# Patient Record
Sex: MALE | Race: BLACK OR AFRICAN AMERICAN | Employment: UNEMPLOYED | URBAN - METROPOLITAN AREA
[De-identification: names, ages, dates, MRNs, and addresses within clinical notes are randomized per-mention and may not be internally consistent; named-entity substitution may affect disease eponyms.]

---

## 2017-01-24 ENCOUNTER — HOSPITAL ENCOUNTER (EMERGENCY)
Age: 27
Discharge: HOME | End: 2017-01-24
Attending: FAMILY MEDICINE
Payer: MEDICAID

## 2017-01-24 ENCOUNTER — APPOINTMENT (OUTPATIENT)
Dept: GENERAL RADIOLOGY | Age: 27
End: 2017-01-24
Attending: FAMILY MEDICINE
Payer: MEDICAID

## 2017-01-24 VITALS
SYSTOLIC BLOOD PRESSURE: 101 MMHG | TEMPERATURE: 98 F | HEART RATE: 106 BPM | RESPIRATION RATE: 30 BRPM | BODY MASS INDEX: 22.14 KG/M2 | OXYGEN SATURATION: 98 % | DIASTOLIC BLOOD PRESSURE: 55 MMHG | WEIGHT: 125 LBS

## 2017-01-24 DIAGNOSIS — J20.9 ACUTE BRONCHITIS, UNSPECIFIED ORGANISM: Primary | ICD-10-CM

## 2017-01-24 LAB
FLUAV AG NPH QL IA: NEGATIVE
FLUBV AG NOSE QL IA: NEGATIVE

## 2017-01-24 PROCEDURE — 87804 INFLUENZA ASSAY W/OPTIC: CPT | Performed by: FAMILY MEDICINE

## 2017-01-24 PROCEDURE — 74011000250 HC RX REV CODE- 250: Performed by: FAMILY MEDICINE

## 2017-01-24 PROCEDURE — 74011250637 HC RX REV CODE- 250/637: Performed by: FAMILY MEDICINE

## 2017-01-24 PROCEDURE — 71010 XR CHEST PORT: CPT

## 2017-01-24 PROCEDURE — 77030018846 HC SOL IRR STRL H20 ICUM -A

## 2017-01-24 PROCEDURE — 99284 EMERGENCY DEPT VISIT MOD MDM: CPT

## 2017-01-24 RX ORDER — DIAZEPAM 5 MG/1
7.5 TABLET ORAL
Status: COMPLETED | OUTPATIENT
Start: 2017-01-24 | End: 2017-01-24

## 2017-01-24 RX ORDER — ALBUTEROL SULFATE 0.83 MG/ML
5 SOLUTION RESPIRATORY (INHALATION) ONCE
Status: COMPLETED | OUTPATIENT
Start: 2017-01-24 | End: 2017-01-24

## 2017-01-24 RX ADMIN — DIAZEPAM 7.5 MG: 5 TABLET ORAL at 12:30

## 2017-01-24 RX ADMIN — ALBUTEROL SULFATE 5 MG: 2.5 SOLUTION RESPIRATORY (INHALATION) at 12:45

## 2017-01-24 NOTE — ED TRIAGE NOTES
Per his caregiver pt has had a low pulse ox reading,  Fever and \"think yellow phlem out of his trach  Sepsis Screening completed    (  )Patient meets SIRS criteria. (x  )Patient does not meet SIRS criteria.       SIRS Criteria is achieved when two or more of the following are present   Temperature < 96.8°F (36°C) or > 100.9°F (38.3°C)   Heart Rate > 90 beats per minute   Respiratory Rate > 20 beats per minute   WBC count > 12,000 or <4,000 or > 10% bands

## 2017-01-24 NOTE — ED PROVIDER NOTES
[address]  EMERGENCY DEPARTMENT HISTORY AND PHYSICAL EXAM       Date: 1/24/2017   Patient Name: Ema Dmuont   YOB: 1990  Medical Record Number: [id number]    History of Presenting Illness     Chief Complaint   Patient presents with    Chest Congestion    Fever        History Provided By:  caregiver    Additional History:   12:25 PM   Ema Dumont is a 32 y.o. 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 Caregiver further reports low grade fever (resolved, temperature in ED 98F) and states pt had thick/yellow mucous in his trach. She states that the pt had an appointment this morning with his PCP Brijesh Gill, DO to get his flu shot but was instead advised to come to the ED for evaluation due to these sxs. Caregiver denies any vomiting. Hx limited by pt's mental condition and nonverbal status.       Primary Care Provider: Isabel Diallo MD   Specialist:    Past History     Past Medical History:   Past Medical History   Diagnosis Date    Cerebral palsy ([address]     Cholesteatoma     Cystitis     Developmental delay      Severe MR secondary to prematurity    DIC (disseminated intravascular coagulation) ([address]     Dysphagia     Eczema     Fracture     GERD (gastroesophageal reflux disease)     Hearing loss      Sensorineural    Heart abnormality      PDA    Hip dislocation, bilateral ([address]     HX OTHER MEDICAL     Hyaline membrane disease     Incontinence      urianry and fecal    Influenza     MR (mental retardation)      severe/profound secondary to prematurity    MRSA (methicillin resistant staph aureus) culture positive     MRSA infection      Bactrium Resistant    Neurogenic bladder     PDA (patent ductus arteriosus)     Pneumonia     Premature birth     Psychiatric disorder Mental Retardation    Respiratory abnormalities      Intubation    Seizures (HCC)     Sleep apnea     Spasticity     Subglottic stenosis      mild    Tracheitis     Vision decreased     Visual field defect         Past Surgical History:   Past Surgical History   Procedure Laterality Date    Hx tracheostomy      Hx tonsil and adenoidectomy      Pr abdomen surgery proc unlisted       18g Dale Tube for Feeding    Hx orthopaedic       Hamstring Release, psoas and adductor release, rhizotomy, fracture repairs    Hx heent       Tympanomastoidectomy        Family History:   History reviewed. No pertinent family history. Social History:   Social History   Substance Use Topics    Smoking status: Never Smoker    Smokeless tobacco: None    Alcohol use No        Allergies: Allergies   Allergen Reactions    Amoxicillin Rash    Augmentin [Amoxicillin-Pot Clavulanate] Rash    Banana Rash    Clindamycin Rash    Genoptic [Gentamicin] Rash    Oatmeal Anti-Itch [Pramoxine-Calamine-Camphor] Rash    Peach (Prunus Persica) Other (comments)     Unknown      Peanut Butter Flavor Rash    Rice Rash    Rocephin [Ceftriaxone] Rash    Tobrex [Tobramycin Sulfate] Rash    Zarontin [Ethosuximide] Unknown (comments)    Zithromax [Azithromycin] Rash        Review of Systems   Review of Systems   Unable to perform ROS: Psychiatric disorder       Physical Exam  Vitals:    01/24/17 1400 01/24/17 1430 01/24/17 1445 01/24/17 1454   BP: 101/55 100/48 101/55    Pulse: 99 (!) 108 (!) 105 (!) 111   Resp: (!) 35 28 (!) 31 27   Temp:       SpO2: 97%   98%   Weight:           Physical Exam   Nursing note and vitals reviewed. Vital signs and nursing notes reviewed    CONSTITUTIONAL: Severely mentally retarded due to CP. Slightly agitated. HEAD:  Normocephalic, atraumatic  EYES: PERRL; EOM's intact. Nystagmus. ENTM: Nose: no rhinorrhea;  Throat: no erythema or exudate, mucous membranes moist  Neck:  No JVD, supple without lymphadenopathy  RESP: There are rhonchi present and pt is tachypneic. Pt has a trach. Equal breath sounds. CV: Tachycardic. S1 and S2 WNL; No murmurs, gallops or rubs. GI: Normal bowel sounds. Pt with a G-tube and wearing an adult diaper. No masses or organomegaly. : No costo-vertebral angle tenderness. BACK:  Non-tender  EXTREMITIES: Muscle weakness in BLEs with contractures in arms and legs. No edema, no calf tenderness. Distal pulses intact. NEURO: CN intact, reflexes 2/4 and sym, sensation intact. SKIN: No rashes; Normal for age and stage. PSYCH: Severely mentally retarded due to CP. Slightly agitated. Diagnostic Study Results     Labs -      Recent Results (from the past 12 hour(s))   INFLUENZA A & B AG (RAPID TEST)    Collection Time: 01/24/17 12:45 PM   Result Value Ref Range    Influenza A Antigen NEGATIVE  NEG      Influenza B Antigen NEGATIVE  NEG         Radiologic Studies -    XR CHEST PORT   Final Result: No acute cardiopulmonary disease. As read by the radiologist.            Medical Decision Making   I am the first provider for this patient. I reviewed the vital signs, available nursing notes, past medical history, past surgical history, family history and social history. Vital Signs-Reviewed the patient's vital signs. Patient Vitals for the past 12 hrs:   Temp Pulse Resp BP SpO2   01/24/17 1454 - (!) 111 27 - 98 %   01/24/17 1445 - (!) 105 (!) 31 101/55 -   01/24/17 1430 - (!) 108 28 100/48 -   01/24/17 1400 - 99 (!) 35 101/55 97 %   01/24/17 1330 - - 30 (!) 119/94 -   01/24/17 1246 - - - - (P) 100 %   01/24/17 1230 - (!) 117 (!) 50 105/79 100 %   01/24/17 1215 - (!) 108 (!) 40 117/86 99 %   01/24/17 1212 - (!) 121 27 134/74 99 %   01/24/17 1202 98 °F (36.7 °C) (!) 120 20 (!) 150/100 98 %       Pulse Oximetry Analysis - Normal 100% on room air.      Cardiac Monitor:   Rate: 109 bpm  Rhythm: Normal Sinus Rhythm      Provider Notes:     INITIAL CLINICAL IMPRESSION and PLANS:  The patient presents with the primary complaint(s) of: low pulse ox. The presentation, to include historical aspects and clinical findings are consistent with the DX of influenza. However, other possible DX's to consider as primary, associated with, or exacerbated by include:    PNA, bronchitis. Considering the above, my initial management plan to evaluate and therapeutic interventions include the following and as noted in the orders:    1. Imaging: CXR    ED Course:     Medications Given in the ED:  Medications   albuterol (PROVENTIL VENTOLIN) nebulizer solution 5 mg (5 mg Nebulization Given 1/24/17 1245)   diazePAM (VALIUM) tablet 7.5 mg (7.5 mg Per G Tube Given 1/24/17 1230)        PROGRESS NOTE:  12:25 PM   Initial assessment performed. 3:00 PM Rosalio Alcaraz MD spoke face to face with Malika Simon DO (Internal Medicine). He states pt can be discharged home and he has written prescriptions for Doxycycline and Mucinex for the pt. Discharge Note:  3:09 PM  Pt has been reexamined. Patient has no new complaints, changes, or physical findings. Care plan outlined and precautions discussed. Results were reviewed with the patient. All medications were reviewed with the patient; will d/c home. All of pt's questions and concerns were addressed. Patient was instructed and agrees to follow up with PCP, as well as to return to the ED upon further deterioration. Patient is ready to go home. Diagnosis   Clinical Impression:   1. Acute bronchitis, unspecified organism         Discussion: Pt has a history of CP. He has a trach. He has had a cough and low grade fever. Sats were stable, CXR was negative, flu was negative. Malika Simon DO saw pt in the ED and put him on Mucinex and Doxycycline.      Follow-up Information     Follow up With Details Comments [id number], MD Schedule an appointment as soon as possible for a visit Primary Care follow up [address] [place] [address] [address] [place] [address] Dr Sarah Olivares [facility] EMERGENCY DEPT  As needed, If symptoms worsen [address] [place]  [phone]          Current Discharge Medication List      CONTINUE these medications which have NOT CHANGED    Details   diazepam (VALIUM) 2 mg tablet 1.5 Tabs by Per G Tube route four (4) times daily. Max Daily Amount: 12 mg.  Qty: 12 Tab, Refills: 0      PHENobarbital (LUMINAL) 32.4 mg tablet 1 Tab by Per G Tube route two (2) times a day. Max Daily Amount: 64.8 mg.  Qty: 10 Tab, Refills: 0      valproic acid, as sodium salt, (DEPAKENE) 250 mg/5 mL (5 mL) soln oral solution 15 mL by Per G Tube route two (2) times a day. Qty: 150 mL, Refills: 0      levofloxacin (LEVAQUIN) 500 mg tablet 1 Tab by Per G Tube route daily. Qty: 7 Tab, Refills: 0      acetaminophen (TYLENOL) 325 mg tablet Take 650 mg by mouth every four (4) hours as needed for Pain or Fever. QUEtiapine (SEROQUEL) 25 mg tablet Take 25 mg by mouth nightly as needed. albuterol-ipratropium (DUO-NEB) 2.5 mg-0.5 mg/3 ml nebulizer solution 3 mL by Nebulization route every four (4) hours as needed. Qty: 30 Vial, Refills: 0      acetylcysteine (MUCOMYST) 100 mg/mL (10 %) nebulizer solution Take 4 mL by inhalation four (4) times daily. Qty: 30 mL, Refills: 0      EPINEPHrine (EPIPEN) 0.3 mg/0.3 mL injection 0.3 mg by IntraMUSCular route once as needed. levETIRAcetam (KEPPRA) 100 mg/mL solution 1,500 mg by PEG Tube route two (2) times a day. budesonide (PULMICORT) 0.5 mg/2 mL nebulizer suspension 500 mcg by Nebulization route two (2) times a day. baclofen (LIORESAL) 10 mg tablet Take 20 mg by mouth three (3) times daily. nystatin (MYCOSTATIN) 100,000 unit/g ointment Apply  to affected area two (2) times a day. multivit,tx w/iron, hematinic, (B PLEX PLUS) 27-0.8 mg Tab Take 1 Tab by mouth daily.         cetirizine (ZYRTEC) 10 mg tablet Take by mouth daily. magnesium hydroxide (CONTRERAS MILK OF MAGNESIA) 400 mg/5 mL suspension Take 15 mL by mouth daily. miconazole (MICOTIN) 2 % topical cream Apply  to affected area two (2) times daily as needed. bacitracin (BACITRACIN) 500 unit/g Oint Apply  to affected area three (3) times daily. bisacodyl (DULCOLAX) 10 mg suppository Insert 10 mg into rectum daily. _______________________________     Attestations: This note is prepared by Emilie Armando, acting as Scribe for Aida Garcia MD.    Aida Garcia MD: The scribe's documentation has been prepared under my direction and personally reviewed by me in its entirety.  I confirm that the note above accurately reflects all work, treatment, procedures, and medical decision making performed by me.   _______________________________

## 2017-01-24 NOTE — CONSULTS
Medicine Consult    Patient:  Conley Nageotte 32 y.o. male 1990  Asked to evaluate patient by Dr Ena Schaumann  Primary Care Provider:  Laura Du MD  Date of Admission:  1/24/2017  Reason for Consult: cough      Assessment/Plan   1. Viral syndrome, likely URI versus possible bronchitis. He is nontoxic appearing, with tachypneia. His tachycardia has improved  2. Cerebral palsy  3. Seizure disorder  4. Chronic respiratory failure post trach      PLAN:    - OK to DC back to group home  - continue scheduled duonebs, add mucinex and can start doxycycline for T> 101  - to follow up with me in next 2-3 days      Patient Active Problem List   Diagnosis Code    Esophageal reflux K21.9    CP (cerebral palsy) ([address] G80.9    Dysphagia R13.10    Seizures ([address] R56.9    Flexion contractures M24.50    Hypercarbia R06.89    Acute bronchitis J20.9    Acute hypoxemic respiratory failure ([address] J96.01    Chronic respiratory failure ([address] J96.10       Thank you for allowing us to participate in  Mr Jackson care  HPI:   CC: tachypnea    History obtained by caregiver present in room    Conley Nageotte is a 32 y.o. male with past medical history significant for cerebral palsy, seizure disorder, chronic trach due to subglottic stenosis, severe MR presents tot he ER due to worsening cough and tachypnea. His caregiver reports that he has been coughing since Friday and was mostly dry. Today he started to have clear secretions, drop his o2 sats in the 80s associated w tachypnea and tachycardia. He has had low grade fever ( 99). He has been tolerating his tube feeds, has had no seizures or behavioral changes. On presentation tot he ER eh was slightly tachypneic but improved w neb treament and suction. Exam w rhonchi but no rales or wheezing noted. He was afebrile. He had a CXR which was clear. On my evaluation he did have thin secretions from trach, in no distress and was exhibiting his normal behaviors.      Past Medical History   Diagnosis Date    [facility]     Cholesteatoma     Cystitis     Developmental delay      Severe MR secondary to prematurity    DIC (disseminated intravascular coagulation) (HCC)     Dysphagia     Eczema     Fracture     GERD (gastroesophageal reflux disease)     Hearing loss      Sensorineural    Heart abnormality      PDA    Hip dislocation, bilateral (HCC)     HX OTHER MEDICAL     Hyaline membrane disease     Incontinence      urianry and fecal    Influenza     MR (mental retardation)      severe/profound secondary to prematurity    MRSA (methicillin resistant staph aureus) culture positive     MRSA infection      Bactrium Resistant    Neurogenic bladder     PDA (patent ductus arteriosus)     Pneumonia     Premature birth     Psychiatric disorder      Mental Retardation    Respiratory abnormalities      Intubation    Seizures (HCC)     Sleep apnea     Spasticity     Subglottic stenosis      mild    Tracheitis     Vision decreased     Visual field defect      Past Surgical History   Procedure Laterality Date    Hx tracheostomy      Hx tonsil and adenoidectomy      Pr abdomen surgery proc unlisted       18g Dale Tube for Feeding    Hx orthopaedic       Hamstring Release, psoas and adductor release, rhizotomy, fracture repairs    Hx heent       Tympanomastoidectomy      Social History   Substance Use Topics    Smoking status: Never Smoker    Smokeless tobacco: None    Alcohol use No     History reviewed. No pertinent family history. No current facility-administered medications on file prior to encounter. Current Outpatient Prescriptions on File Prior to Encounter   Medication Sig Dispense Refill    diazepam (VALIUM) 2 mg tablet 1.5 Tabs by Per G Tube route four (4) times daily. Max Daily Amount: 12 mg. 12 Tab 0    PHENobarbital (LUMINAL) 32.4 mg tablet 1 Tab by Per G Tube route two (2) times a day.  Max Daily Amount: 64.8 mg. 10 Tab 0    valproic acid, as sodium salt, (DEPAKENE) 250 mg/5 mL (5 mL) soln oral solution 15 mL by Per G Tube route two (2) times a day. 150 mL 0    levofloxacin (LEVAQUIN) 500 mg tablet 1 Tab by Per G Tube route daily. 7 Tab 0    acetaminophen (TYLENOL) 325 mg tablet Take 650 mg by mouth every four (4) hours as needed for Pain or Fever.  QUEtiapine (SEROQUEL) 25 mg tablet Take 25 mg by mouth nightly as needed.  albuterol-ipratropium (DUO-NEB) 2.5 mg-0.5 mg/3 ml nebulizer solution 3 mL by Nebulization route every four (4) hours as needed. (Patient taking differently: 3 mL by Nebulization route every six (6) hours. ) 30 Vial 0    acetylcysteine (MUCOMYST) 100 mg/mL (10 %) nebulizer solution Take 4 mL by inhalation four (4) times daily. 30 mL 0    EPINEPHrine (EPIPEN) 0.3 mg/0.3 mL injection 0.3 mg by IntraMUSCular route once as needed.  levETIRAcetam (KEPPRA) 100 mg/mL solution 1,500 mg by PEG Tube route two (2) times a day.  budesonide (PULMICORT) 0.5 mg/2 mL nebulizer suspension 500 mcg by Nebulization route two (2) times a day.  baclofen (LIORESAL) 10 mg tablet Take 20 mg by mouth three (3) times daily.  nystatin (MYCOSTATIN) 100,000 unit/g ointment Apply  to affected area two (2) times a day.  multivit,tx w/iron, hematinic, (B PLEX PLUS) 27-0.8 mg Tab Take 1 Tab by mouth daily.  cetirizine (ZYRTEC) 10 mg tablet Take  by mouth daily.  magnesium hydroxide (CONTRERAS MILK OF MAGNESIA) 400 mg/5 mL suspension Take 15 mL by mouth daily.  miconazole (MICOTIN) 2 % topical cream Apply  to affected area two (2) times daily as needed.  bacitracin (BACITRACIN) 500 unit/g Oint Apply  to affected area three (3) times daily.  bisacodyl (DULCOLAX) 10 mg suppository Insert 10 mg into rectum daily.           Allergies   Allergen Reactions    Amoxicillin Rash    Augmentin [Amoxicillin-Pot Clavulanate] Rash    Banana Rash    Clindamycin Rash    Genoptic [Gentamicin] Rash    Oatmeal Anti-Itch [Pramoxine-Calamine-Camphor] Rash    Peach (Prunus Persica) Other (comments)     Unknown      Peanut Butter Flavor Rash    Rice Rash    Rocephin [Ceftriaxone] Rash    Tobrex [Tobramycin Sulfate] Rash    Zarontin [Ethosuximide] Unknown (comments)    Zithromax [Azithromycin] Rash         Review of Systems  Unable to obtain 2/2 mental state    Physical Exam:      Visit Vitals    /55    Pulse (!) 106    Temp 98 °F (36.7 °C)    Resp 30    Wt 56.7 kg (125 lb)    SpO2 98%    BMI 22.14 kg/m2     Body mass index is 22.14 kg/(m^2).     Physical Exam:  GEN: chronically ill appearing, does not follow commands, chewing on oxygen tubing  HEENT: atraumatic, nose normal,oropharynx clear, MMM, trach in place w scan thin secretions noted  NECK: supple, trachea midline, no supraclavicular or submandibular adenopathy noted  EYES: conjuctiva normal, + nystagmus  LUNGS: coarse, no wheezing, equal expansion noted, no distress  AB: soft, +BS, nt/nd no organomegaly  NEURO: does not track or make eye contact  SKIN: dry, intact, warm no breakdown noted    Imaging studies personally reviewed:  CXR: clear      Antwon Krishna DO  Internal Medicine/Geriatrics

## 2017-01-24 NOTE — DISCHARGE INSTRUCTIONS
Bronchitis: Care Instructions  Your Care Instructions    Bronchitis is inflammation of the bronchial tubes, which carry air to the lungs. The tubes swell and produce mucus, or phlegm. The mucus and inflamed bronchial tubes make you cough. You may have trouble breathing. Most cases of bronchitis are caused by viruses like those that cause colds. Antibiotics usually do not help and they may be harmful. Bronchitis usually develops rapidly and lasts about 2 to 3 weeks in otherwise healthy people. Follow-up care is a key part of your treatment and safety. Be sure to make and go to all appointments, and call your doctor if you are having problems. It's also a good idea to know your test results and keep a list of the medicines you take. How can you care for yourself at home? · Take all medicines exactly as prescribed. Call your doctor if you think you are having a problem with your medicine. · Get some extra rest.  · Take an over-the-counter pain medicine, such as acetaminophen (Tylenol), ibuprofen (Advil, Motrin), or naproxen (Aleve) to reduce fever and relieve body aches. Read and follow all instructions on the label. · Do not take two or more pain medicines at the same time unless the doctor told you to. Many pain medicines have acetaminophen, which is Tylenol. Too much acetaminophen (Tylenol) can be harmful. · Take an over-the-counter cough medicine that contains dextromethorphan to help quiet a dry, hacking cough so that you can sleep. Avoid cough medicines that have more than one active ingredient. Read and follow all instructions on the label. · Breathe moist air from a humidifier, hot shower, or sink filled with hot water. The heat and moisture will thin mucus so you can cough it out. · Do not smoke. Smoking can make bronchitis worse. If you need help quitting, talk to your doctor about stop-smoking programs and medicines. These can increase your chances of quitting for good.   When should you call for help? Call 911 anytime you think you may need emergency care. For example, call if:  · You have severe trouble breathing. Call your doctor now or seek immediate medical care if:  · You have new or worse trouble breathing. · You cough up dark brown or bloody mucus (sputum). · You have a new or higher fever. · You have a new rash. Watch closely for changes in your health, and be sure to contact your doctor if:  · You cough more deeply or more often, especially if you notice more mucus or a change in the color of your mucus. · You are not getting better as expected. Where can you learn more? Go to http://boaz-alee.info/. Enter H333 in the search box to learn more about \"Bronchitis: Care Instructions. \"  Current as of: May 23, 2016  Content Version: 11.1  © 8405-1071 [employer]. Care instructions adapted under license by [employer] (which disclaims liability or warranty for this information). If you have questions about a medical condition or this instruction, always ask your healthcare professional. [address] any warranty or liability for your use of this information.

## 2017-02-01 NOTE — ROUTINE PROCESS
..EMR entered and reviewed by Emergency Department nurse manager for the purpose of chart review in the course of performing managerial functions and responsibilities related to performance improvement.

## 2017-02-07 ENCOUNTER — HOSPITAL ENCOUNTER (OUTPATIENT)
Dept: LAB | Age: 27
Discharge: HOME | End: 2017-02-07
Payer: MEDICAID

## 2017-02-07 LAB
ERYTHROCYTE [DISTWIDTH] IN BLOOD BY AUTOMATED COUNT: 12.9 % (ref 11.6–14.5)
HCT VFR BLD AUTO: 49.8 % (ref 36–48)
HGB BLD-MCNC: 17.2 G/DL (ref 13–16)
MCH RBC QN AUTO: 29.2 PG (ref 24–34)
MCHC RBC AUTO-ENTMCNC: 34.5 G/DL (ref 31–37)
MCV RBC AUTO: 84.6 FL (ref 74–97)
PLATELET # BLD AUTO: 120 K/UL (ref 135–420)
PMV BLD AUTO: 11.5 FL (ref 9.2–11.8)
RBC # BLD AUTO: 5.89 M/UL (ref 4.7–5.5)
WBC # BLD AUTO: 5.6 K/UL (ref 4.6–13.2)

## 2017-02-07 PROCEDURE — 36415 COLL VENOUS BLD VENIPUNCTURE: CPT | Performed by: INTERNAL MEDICINE

## 2017-02-07 PROCEDURE — 85027 COMPLETE CBC AUTOMATED: CPT | Performed by: INTERNAL MEDICINE

## 2017-02-08 ENCOUNTER — HOSPITAL ENCOUNTER (OUTPATIENT)
Dept: LAB | Age: 27
Discharge: HOME | End: 2017-02-08
Attending: INTERNAL MEDICINE
Payer: MEDICAID

## 2017-02-08 LAB
ALBUMIN SERPL BCP-MCNC: 4.2 G/DL (ref 3.4–5)
ALBUMIN/GLOB SERPL: 1.1 {RATIO} (ref 0.8–1.7)
ALP SERPL-CCNC: 98 U/L (ref 45–117)
ALT SERPL-CCNC: 26 U/L (ref 16–61)
ANION GAP BLD CALC-SCNC: 4 MMOL/L (ref 3–18)
AST SERPL W P-5'-P-CCNC: 23 U/L (ref 15–37)
BILIRUB SERPL-MCNC: 0.7 MG/DL (ref 0.2–1)
BUN SERPL-MCNC: 16 MG/DL (ref 7–18)
BUN/CREAT SERPL: 25 (ref 12–20)
CALCIUM SERPL-MCNC: 9.9 MG/DL (ref 8.5–10.1)
CHLORIDE SERPL-SCNC: 100 MMOL/L (ref 100–108)
CO2 SERPL-SCNC: 39 MMOL/L (ref 21–32)
CREAT SERPL-MCNC: 0.63 MG/DL (ref 0.6–1.3)
GLOBULIN SER CALC-MCNC: 3.9 G/DL (ref 2–4)
GLUCOSE SERPL-MCNC: 122 MG/DL (ref 74–99)
POTASSIUM SERPL-SCNC: 4.5 MMOL/L (ref 3.5–5.5)
PROT SERPL-MCNC: 8.1 G/DL (ref 6.4–8.2)
SODIUM SERPL-SCNC: 143 MMOL/L (ref 136–145)
TSH SERPL DL<=0.05 MIU/L-ACNC: 3.69 UIU/ML (ref 0.36–3.74)
VALPROATE SERPL-MCNC: 90 UG/ML (ref 50–100)

## 2017-02-08 PROCEDURE — 80053 COMPREHEN METABOLIC PANEL: CPT | Performed by: INTERNAL MEDICINE

## 2017-02-08 PROCEDURE — 36415 COLL VENOUS BLD VENIPUNCTURE: CPT | Performed by: INTERNAL MEDICINE

## 2017-02-08 PROCEDURE — 80177 DRUG SCRN QUAN LEVETIRACETAM: CPT | Performed by: INTERNAL MEDICINE

## 2017-02-08 PROCEDURE — 84443 ASSAY THYROID STIM HORMONE: CPT | Performed by: INTERNAL MEDICINE

## 2017-02-08 PROCEDURE — 80164 ASSAY DIPROPYLACETIC ACD TOT: CPT | Performed by: INTERNAL MEDICINE

## 2017-02-09 LAB
FAX TO INFO,FAXT: NORMAL
FAX TO NUMBER,FAXN: NORMAL

## 2017-02-10 LAB — LEVETIRACETAM SERPL-MCNC: 24.1 UG/ML (ref 10–40)

## 2017-06-28 ENCOUNTER — HOSPITAL ENCOUNTER (EMERGENCY)
Age: 27
Discharge: HOME | End: 2017-06-28
Attending: EMERGENCY MEDICINE | Admitting: EMERGENCY MEDICINE
Payer: MEDICAID

## 2017-06-28 ENCOUNTER — APPOINTMENT (OUTPATIENT)
Dept: GENERAL RADIOLOGY | Age: 27
End: 2017-06-28
Attending: EMERGENCY MEDICINE
Payer: MEDICAID

## 2017-06-28 VITALS
HEIGHT: 60 IN | DIASTOLIC BLOOD PRESSURE: 81 MMHG | WEIGHT: 125 LBS | SYSTOLIC BLOOD PRESSURE: 138 MMHG | RESPIRATION RATE: 40 BRPM | OXYGEN SATURATION: 97 % | BODY MASS INDEX: 24.54 KG/M2 | HEART RATE: 109 BPM | TEMPERATURE: 98 F

## 2017-06-28 DIAGNOSIS — R09.02 HYPOXIA: Primary | ICD-10-CM

## 2017-06-28 LAB
ALBUMIN SERPL BCP-MCNC: 3.5 G/DL (ref 3.4–5)
ALBUMIN/GLOB SERPL: 0.9 {RATIO} (ref 0.8–1.7)
ALP SERPL-CCNC: 80 U/L (ref 45–117)
ALT SERPL-CCNC: 20 U/L (ref 16–61)
ANION GAP BLD CALC-SCNC: 6 MMOL/L (ref 3–18)
AST SERPL W P-5'-P-CCNC: 17 U/L (ref 15–37)
BASOPHILS # BLD AUTO: 0 K/UL (ref 0–0.06)
BASOPHILS # BLD: 0 % (ref 0–2)
BILIRUB SERPL-MCNC: 0.8 MG/DL (ref 0.2–1)
BUN SERPL-MCNC: 13 MG/DL (ref 7–18)
BUN/CREAT SERPL: 21 (ref 12–20)
CALCIUM SERPL-MCNC: 9.1 MG/DL (ref 8.5–10.1)
CHLORIDE SERPL-SCNC: 101 MMOL/L (ref 100–108)
CO2 SERPL-SCNC: 33 MMOL/L (ref 21–32)
CREAT SERPL-MCNC: 0.61 MG/DL (ref 0.6–1.3)
DIFFERENTIAL METHOD BLD: ABNORMAL
EOSINOPHIL # BLD: 0.3 K/UL (ref 0–0.4)
EOSINOPHIL NFR BLD: 4 % (ref 0–5)
ERYTHROCYTE [DISTWIDTH] IN BLOOD BY AUTOMATED COUNT: 13.5 % (ref 11.6–14.5)
GLOBULIN SER CALC-MCNC: 4.1 G/DL (ref 2–4)
GLUCOSE SERPL-MCNC: 116 MG/DL (ref 74–99)
HCT VFR BLD AUTO: 48.7 % (ref 36–48)
HGB BLD-MCNC: 16.6 G/DL (ref 13–16)
LACTATE SERPL-SCNC: 1.6 MMOL/L (ref 0.4–2)
LYMPHOCYTES # BLD AUTO: 15 % (ref 21–52)
LYMPHOCYTES # BLD: 1.3 K/UL (ref 0.9–3.6)
MCH RBC QN AUTO: 28.2 PG (ref 24–34)
MCHC RBC AUTO-ENTMCNC: 34.1 G/DL (ref 31–37)
MCV RBC AUTO: 82.8 FL (ref 74–97)
MONOCYTES # BLD: 0.6 K/UL (ref 0.05–1.2)
MONOCYTES NFR BLD AUTO: 7 % (ref 3–10)
NEUTS SEG # BLD: 6.4 K/UL (ref 1.8–8)
NEUTS SEG NFR BLD AUTO: 74 % (ref 40–73)
PLATELET # BLD AUTO: 151 K/UL (ref 135–420)
PMV BLD AUTO: 11.5 FL (ref 9.2–11.8)
POTASSIUM SERPL-SCNC: 4.4 MMOL/L (ref 3.5–5.5)
PROT SERPL-MCNC: 7.6 G/DL (ref 6.4–8.2)
RBC # BLD AUTO: 5.88 M/UL (ref 4.7–5.5)
SODIUM SERPL-SCNC: 140 MMOL/L (ref 136–145)
WBC # BLD AUTO: 8.6 K/UL (ref 4.6–13.2)

## 2017-06-28 PROCEDURE — 85025 COMPLETE CBC W/AUTO DIFF WBC: CPT | Performed by: EMERGENCY MEDICINE

## 2017-06-28 PROCEDURE — 94640 AIRWAY INHALATION TREATMENT: CPT

## 2017-06-28 PROCEDURE — 87070 CULTURE OTHR SPECIMN AEROBIC: CPT | Performed by: EMERGENCY MEDICINE

## 2017-06-28 PROCEDURE — 77030013140 HC MSK NEB VYRM -A

## 2017-06-28 PROCEDURE — 87077 CULTURE AEROBIC IDENTIFY: CPT | Performed by: EMERGENCY MEDICINE

## 2017-06-28 PROCEDURE — 99285 EMERGENCY DEPT VISIT HI MDM: CPT

## 2017-06-28 PROCEDURE — 71010 XR CHEST PORT: CPT

## 2017-06-28 PROCEDURE — 83605 ASSAY OF LACTIC ACID: CPT | Performed by: EMERGENCY MEDICINE

## 2017-06-28 PROCEDURE — 87040 BLOOD CULTURE FOR BACTERIA: CPT | Performed by: EMERGENCY MEDICINE

## 2017-06-28 PROCEDURE — 93005 ELECTROCARDIOGRAM TRACING: CPT

## 2017-06-28 PROCEDURE — 77030009834 HC MSK O2 TRACH VYRM -A

## 2017-06-28 PROCEDURE — 77010033678 HC OXYGEN DAILY

## 2017-06-28 PROCEDURE — 74011250636 HC RX REV CODE- 250/636: Performed by: EMERGENCY MEDICINE

## 2017-06-28 PROCEDURE — 74011000250 HC RX REV CODE- 250: Performed by: EMERGENCY MEDICINE

## 2017-06-28 PROCEDURE — 87186 SC STD MICRODIL/AGAR DIL: CPT | Performed by: EMERGENCY MEDICINE

## 2017-06-28 PROCEDURE — 80053 COMPREHEN METABOLIC PANEL: CPT | Performed by: EMERGENCY MEDICINE

## 2017-06-28 PROCEDURE — 89220 SPUTUM SPECIMEN COLLECTION: CPT

## 2017-06-28 PROCEDURE — 96365 THER/PROPH/DIAG IV INF INIT: CPT

## 2017-06-28 PROCEDURE — 96361 HYDRATE IV INFUSION ADD-ON: CPT

## 2017-06-28 RX ORDER — FLUOCINONIDE 0.5 MG/G
OINTMENT TOPICAL 2 TIMES DAILY
COMMUNITY
End: 2018-06-07

## 2017-06-28 RX ORDER — LEVOFLOXACIN 5 MG/ML
750 INJECTION, SOLUTION INTRAVENOUS EVERY 24 HOURS
Status: DISCONTINUED | OUTPATIENT
Start: 2017-06-28 | End: 2017-06-28 | Stop reason: HOSPADM

## 2017-06-28 RX ORDER — POLYETHYLENE GLYCOL 3350 17 G/17G
17 POWDER, FOR SOLUTION ORAL DAILY
COMMUNITY

## 2017-06-28 RX ORDER — DIAZEPAM 5 MG/1
5 TABLET ORAL
COMMUNITY
End: 2018-06-07

## 2017-06-28 RX ORDER — SODIUM CHLORIDE 0.9 % (FLUSH) 0.9 %
5-10 SYRINGE (ML) INJECTION AS NEEDED
Status: DISCONTINUED | OUTPATIENT
Start: 2017-06-28 | End: 2017-06-28 | Stop reason: HOSPADM

## 2017-06-28 RX ORDER — IPRATROPIUM BROMIDE AND ALBUTEROL SULFATE 2.5; .5 MG/3ML; MG/3ML
3 SOLUTION RESPIRATORY (INHALATION) ONCE
Status: COMPLETED | OUTPATIENT
Start: 2017-06-28 | End: 2017-06-28

## 2017-06-28 RX ORDER — FORMOTEROL FUMARATE 20 UG/2ML
SOLUTION RESPIRATORY (INHALATION) ONCE
COMMUNITY
End: 2018-06-07

## 2017-06-28 RX ORDER — LEVOFLOXACIN 750 MG/1
TABLET ORAL
Qty: 7 TAB | Refills: 0 | Status: SHIPPED | OUTPATIENT
Start: 2017-06-28 | End: 2018-02-28

## 2017-06-28 RX ORDER — DIAZEPAM 10 MG/2ML
5 GEL RECTAL
COMMUNITY
End: 2018-11-02

## 2017-06-28 RX ORDER — ALBUTEROL SULFATE 0.83 MG/ML
SOLUTION RESPIRATORY (INHALATION) ONCE
COMMUNITY
End: 2018-02-28

## 2017-06-28 RX ORDER — SODIUM CHLORIDE FOR INHALATION 0.9 %
2.5 VIAL, NEBULIZER (ML) INHALATION AS NEEDED
COMMUNITY

## 2017-06-28 RX ADMIN — IPRATROPIUM BROMIDE AND ALBUTEROL SULFATE 3 ML: .5; 3 SOLUTION RESPIRATORY (INHALATION) at 08:52

## 2017-06-28 RX ADMIN — SODIUM CHLORIDE 1701 ML: 9 INJECTION, SOLUTION INTRAVENOUS at 08:35

## 2017-06-28 RX ADMIN — LEVOFLOXACIN 750 MG: 5 INJECTION, SOLUTION INTRAVENOUS at 09:20

## 2017-06-28 NOTE — ED PROVIDER NOTES
[address]  EMERGENCY DEPARTMENT HISTORY AND PHYSICAL EXAM       Date: 6/28/2017   Patient Name: Isabel Vazquez   YOB: 1990  Medical Record Number: [id number]    History of Presenting Illness     Chief Complaint   Patient presents with    Breathing Problem        History Provided By:  EMS    Additional History:   8:18 AM  Isabel Vazquez is a 32 y.o. male presenting to the ED via EMS with hx of mental retardation and cerebral palsy for evaluation of low O2 sat, to low 70s. Pt lives at home with 24 hour care, when the nurse noticed pt's O2 Sat dropped down to the low 70s, prompting her to call EMS. Pt was placed on non rebreather en route bringing O2 Sat up to 98%. Fever 99 F was reported per home nurse, but no fever noted en route by EMS. Pt has trach in place, is non-verbal, and has a G tube. Hx is limited due to pts condition. Pt placed on trach collar 70% in ED.      Primary Care Provider: Sapna Billings MD   Specialist:    Past History     Past Medical History:   Past Medical History:   Diagnosis Date    Cerebral palsy ([address]     Cholesteatoma     Cystitis     Developmental delay     Severe MR secondary to prematurity    DIC (disseminated intravascular coagulation) ([address]     Dysphagia     Eczema     Fracture     GERD (gastroesophageal reflux disease)     Hearing loss     Sensorineural    Heart abnormality     PDA    Hip dislocation, bilateral ([address]     HX OTHER MEDICAL     Hyaline membrane disease     Incontinence     urianry and fecal    Influenza     MR (mental retardation)     severe/profound secondary to prematurity    MRSA (methicillin resistant staph aureus) culture positive     MRSA infection     Bactrium Resistant    Neurogenic bladder     PDA (patent ductus arteriosus)     Pneumonia     Premature birth     Psychiatric disorder     Mental Retardation    Respiratory abnormalities     Intubation    Seizures (HCC)     Sleep apnea     Spasticity     Subglottic stenosis     mild    Tracheitis     Vision decreased     Visual field defect         Past Surgical History:   Past Surgical History:   Procedure Laterality Date    ABDOMEN SURGERY PROC UNLISTED      18g Dale Tube for Feeding    HX HEENT      Tympanomastoidectomy    HX ORTHOPAEDIC      Hamstring Release, psoas and adductor release, rhizotomy, fracture repairs    HX TONSIL AND ADENOIDECTOMY      HX TRACHEOSTOMY          Social History:   Social History   Substance Use Topics    Smoking status: Never Smoker    Smokeless tobacco: Never Used    Alcohol use No        Allergies: Allergies   Allergen Reactions    Amoxicillin Rash    Ampicillin Unknown (comments)    Augmentin [Amoxicillin-Pot Clavulanate] Rash    Banana Rash    Carbamide Peroxide Unknown (comments)    Clindamycin Rash    Genoptic [Gentamicin] Rash    Oatmeal Anti-Itch [Pramoxine-Calamine-Camphor] Rash    Peach (Prunus Persica) Other (comments)     Unknown      Peanut Butter Flavor Rash    Rice Rash    Rocephin [Ceftriaxone] Rash    Tobrex [Tobramycin Sulfate] Rash    Zarontin [Ethosuximide] Unknown (comments)    Zithromax [Azithromycin] Rash        Review of Systems   Review of Systems   Unable to perform ROS: Patient nonverbal         Physical Exam  Vitals:    06/28/17 0831 06/28/17 0846 06/28/17 0853   BP: 138/81     Pulse: (!) 109     Resp: (!) 40     Temp: 98 °F (36.7 °C)     SpO2: 91% 97% 97%   Weight: 56.7 kg (125 lb)     Height: 5' (1.524 m)         Physical Exam   Nursing note and vitals reviewed. Constitutional: chronically ill appearing. Trach collar in place. Head: Atraumatic   Neck: Supple  Cardiovascular: Tachycardic, RRR  Chest: Normal work of breathing and chest excursion bilaterally  Lungs:  Coarse breath sounds throughout. Tachypneic. Slightly prolonged expiratory phase. Abdomen: Soft, PEG tube in place site dry with no signs of infection.   Back: No evidence of trauma Extremities: contracture in all 4 extremities. No evidence of trauma   Skin: Warm and dry  Neuro: Alert, baseline for this pt. Diagnostic Study Results     Labs -      Recent Results (from the past 12 hour(s))   LACTIC ACID, PLASMA    Collection Time: 06/28/17  8:50 AM   Result Value Ref Range    Lactic acid 1.6 0.4 - 2.0 MMOL/L   METABOLIC PANEL, COMPREHENSIVE    Collection Time: 06/28/17  8:50 AM   Result Value Ref Range    Sodium 140 136 - 145 mmol/L    Potassium 4.4 3.5 - 5.5 mmol/L    Chloride 101 100 - 108 mmol/L    CO2 33 (H) 21 - 32 mmol/L    Anion gap 6 3.0 - 18 mmol/L    Glucose 116 (H) 74 - 99 mg/dL    BUN 13 7.0 - 18 MG/DL    Creatinine 0.61 0.6 - 1.3 MG/DL    BUN/Creatinine ratio 21 (H) 12 - 20      GFR est AA >60 >60 ml/min/1.73m2    GFR est non-AA >60 >60 ml/min/1.73m2    Calcium 9.1 8.5 - 10.1 MG/DL    Bilirubin, total 0.8 0.2 - 1.0 MG/DL    ALT (SGPT) 20 16 - 61 U/L    AST (SGOT) 17 15 - 37 U/L    Alk. phosphatase 80 45 - 117 U/L    Protein, total 7.6 6.4 - 8.2 g/dL    Albumin 3.5 3.4 - 5.0 g/dL    Globulin 4.1 (H) 2.0 - 4.0 g/dL    A-G Ratio 0.9 0.8 - 1.7     CBC WITH AUTOMATED DIFF    Collection Time: 06/28/17  8:50 AM   Result Value Ref Range    WBC 8.6 4.6 - 13.2 K/uL    RBC 5.88 (H) 4.70 - 5.50 M/uL    HGB 16.6 (H) 13.0 - 16.0 g/dL    HCT 48.7 (H) 36.0 - 48.0 %    MCV 82.8 74.0 - 97.0 FL    MCH 28.2 24.0 - 34.0 PG    MCHC 34.1 31.0 - 37.0 g/dL    RDW 13.5 11.6 - 14.5 %    PLATELET 031 269 - 777 K/uL    MPV 11.5 9.2 - 11.8 FL    NEUTROPHILS 74 (H) 40 - 73 %    LYMPHOCYTES 15 (L) 21 - 52 %    MONOCYTES 7 3 - 10 %    EOSINOPHILS 4 0 - 5 %    BASOPHILS 0 0 - 2 %    ABS. NEUTROPHILS 6.4 1.8 - 8.0 K/UL    ABS. LYMPHOCYTES 1.3 0.9 - 3.6 K/UL    ABS. MONOCYTES 0.6 0.05 - 1.2 K/UL    ABS. EOSINOPHILS 0.3 0.0 - 0.4 K/UL    ABS.  BASOPHILS 0.0 0.0 - 0.06 K/UL    DF AUTOMATED     EKG, 12 LEAD, INITIAL    Collection Time: 06/28/17  9:16 AM   Result Value Ref Range    Ventricular Rate 102 BPM    Atrial Rate 102 BPM    P-R Interval 126 ms    QRS Duration 72 ms    Q-T Interval 292 ms    QTC Calculation (Bezet) 380 ms    Calculated P Axis 85 degrees    Calculated R Axis 76 degrees    Calculated T Axis 34 degrees    Diagnosis       Sinus tachycardia  Nonspecific T wave abnormality  Abnormal ECG  When compared with ECG of 07-AUG-2016 22:09,  Nonspecific T wave abnormality has replaced inverted T waves in Anterior   leads         Radiologic Studies -    XR CHEST PORT   Final Result   IMPRESSION:     1. Stable exam. No acute cardiopulmonary process.     As read by the radiologist.            Medical Decision Making   I am the first provider for this patient. I reviewed the vital signs, available nursing notes, past medical history, past surgical history, family history and social history. Vital Signs-Reviewed the patient's vital signs. Patient Vitals for the past 12 hrs:   Temp Pulse Resp BP SpO2   06/28/17 0853 - - - - 97 %   06/28/17 0846 - - - - 97 %   06/28/17 0831 98 °F (36.7 °C) (!) 109 (!) 40 138/81 91 %       Pulse Oximetry Analysis - Normal 96% on trach collar      Cardiac Monitor:   Rate: 101 bpm  Rhythm: Sinus tachycardia    EKG interpretation: (Preliminary)  Rate 102 bpm. Sinus tachycardia. Nonspecific T wave abnormality. EKG read by Sujatha Mosquera MD at 9:16 AM    Old Medical Records: Old medical records. Nursing notes. Provider Notes:      Procedures:       ED Course:      8:18 AM  Initial assessment performed. 10:16 AM  Spoke with caretaker who states pt is at his baseline and looks good to her. Reviewed labs and imaging. Caretaker would prefer to take pt home. Understands return precautions. Will provide abx and discharge. DISCHARGE NOTE:   10:27 AM  Zulma Wolfe results have been reviewed with his caretaker. They have been counseled regarding his diagnosis, treatment, and plan.   They verbally convey understanding and agreement of the signs, symptoms, diagnosis, treatment and prognosis and additionally agree to follow up as discussed. They also agree with the care-plan and conveys that all of their questions have been answered. I have also provided discharge instructions for his that include: educational information regarding his diagnosis and treatment, and list of reasons why they would want to return to the ED prior to their follow-up appointment, should his condition change. Medications   sodium chloride (NS) flush 5-10 mL (not administered)   levoFLOXacin (LEVAQUIN) 750 mg in D5W IVPB (750 mg IntraVENous New Bag 6/28/17 0920)   albuterol-ipratropium (DUO-NEB) 2.5 MG-0.5 MG/3 ML (3 mL Nebulization Given 6/28/17 0827)   sodium chloride 0.9 % bolus infusion 1,701 mL (1,701 mL IntraVENous New Bag 6/28/17 0835)         Diagnosis   Clinical Impression:   1. Hypoxia         Discussion:  32 y.o male with multiple chronic medical issues presenting for episode of hypoxia. Improved with suctioning and neb tx. XR and Labs benign. Discussed wikth caretaker plan to discharge with abx and return precautions.      Follow-up Information     Follow up With Details Comments [address] Schedule an appointment as soon as possible for a visit in 2 days for PCP follow up [address] [place] [address] EMERGENCY DEPT Go to As needed, If symptoms worsen [address] [id number]  [phone]          Current Discharge Medication List      START taking these medications    Details   levoFLOXacin (LEVAQUIN) 750 mg tablet Please provide liquid form for PEG tube administration  Qty: 7 Tab, Refills: 0           _______________________________   Attestations:     SCRIBE ATTESTATION STATEMENT  Documented by: Thais Cummins, scribing for and in the presence of Keanu Foley MD.     PROVIDER ATTESTATION STATEMENT  I personally performed the services described in the documentation, reviewed the documentation, as recorded by the scribe in my presence, and it accurately and completely records my words and actions.   Manolo Light MD.      _______________________________

## 2017-06-28 NOTE — ED NOTES
Patient resting on stretcher. Contracted upper and lower extremities. Trach suctioned x 3 with thick white secretions noted. Spontaneous coughing. G-tube is CDI. Currently receiving IV NSS, and IV Levaquin. Unable to obtain urine specimen. Sputum culture sent to lab along with labs, lactic and blood cultures. Nurse from the group home is present at the bedside.

## 2017-06-28 NOTE — ED TRIAGE NOTES
Presents today by EMS for low O2 saturations. Lives in home with 24/hr care, this AM sats dropped to low 70s. Pt was on 6L/trach collar @ their arrival 76%, placed on NRB over collar up to 98% with collar. Sepsis Screening completed    ( x )Patient meets SIRS criteria. (  )Patient does not meet SIRS criteria.       SIRS Criteria is achieved when two or more of the following are present   Temperature < 96.8°F (36°C) or > 100.9°F (38.3°C)   Heart Rate > 90 beats per minute   Respiratory Rate > 20 breaths per minute   WBC count > 12,000 or <4,000 or > 10% bands

## 2017-06-28 NOTE — ED NOTES
Patient resting on the stretcher. No acute distress noted. Suctioned x 1. Thick white secretions obtained. Trach care provided.  Awaiting for EMS transportation

## 2017-06-28 NOTE — DISCHARGE INSTRUCTIONS
Learning About Hypoxemia  What is hypoxemia? Hypoxemia means that you don't have enough oxygen in your blood. It's a result of diseases that affect your heart or lungs. These include heart failure, COPD, and pulmonary fibrosis (scarring of the lungs). Being at high altitudes can also lead to hypoxemia. What happens when you have hypoxemia? Oxygen gets into your blood through your lungs. Your blood carries the oxygen to all parts of your body. When you have too little oxygen in your blood, your body doesn't get enough of it. With too little oxygen, your heart and other parts of your body don't work very well. What are the symptoms? In addition to the symptoms of whatever is causing your hypoxemia, you may:  · Get tired quickly. · Be short of breath when you are active. · Feel like your heart is pounding or racing. · Feel weak or dizzy. · Become confused. How is hypoxemia treated? Your doctor will do tests to find out how much oxygen is in your blood. He or she will look for the cause of your hypoxemia and treat that problem. For example, if you have heart failure, you may need medicines that help your heart pump better. · If your hypoxemia is not severe, your doctor may give you oxygen through a mask or nasal cannula (say \"KAROLINE-danielleh-nida\"). A cannula is a thin tube with two openings that fit just inside your nose. · If your hypoxemia is severe, you may have a breathing tube put into your windpipe. The breathing tube is attached to a machine that pushes air into your lungs. This machine is called a ventilator. · If you have a long-term problem with hypoxemia, your doctor may recommend that you use oxygen regularly. Some people need it all the time. Others need it from time to time throughout the day or overnight. Your doctor will tell you how much oxygen you need and how often to use it. Follow-up care is a key part of your treatment and safety.  Be sure to make and go to all appointments, and call your doctor if you are having problems. It's also a good idea to know your test results and keep a list of the medicines you take. Where can you learn more? Go to http://boaz-alee.info/. Enter M375 in the search box to learn more about \"Learning About Hypoxemia. \"  Current as of: March 25, 2017  Content Version: 11.3  © 2760-0972 [employer]. Care instructions adapted under license by [employer] (which disclaims liability or warranty for this information). If you have questions about a medical condition or this instruction, always ask your healthcare professional. [place] [address] any warranty or liability for your use of this information.

## 2017-06-30 LAB
ATRIAL RATE: 102 BPM
CALCULATED P AXIS, ECG09: 85 DEGREES
CALCULATED R AXIS, ECG10: 76 DEGREES
CALCULATED T AXIS, ECG11: 34 DEGREES
DIAGNOSIS, 93000: NORMAL
P-R INTERVAL, ECG05: 126 MS
Q-T INTERVAL, ECG07: 292 MS
QRS DURATION, ECG06: 72 MS
QTC CALCULATION (BEZET), ECG08: 380 MS
VENTRICULAR RATE, ECG03: 102 BPM

## 2017-07-01 NOTE — PROGRESS NOTES
1:14 PM   07/01/17    Pt treated with Levaquin. Culture shows sensitivity to Levaquin.        Nenita White PA-C

## 2017-07-02 LAB
BACTERIA SPEC CULT: ABNORMAL
GRAM STN SPEC: ABNORMAL
SERVICE CMNT-IMP: ABNORMAL

## 2017-07-04 LAB
BACTERIA SPEC CULT: NORMAL
SERVICE CMNT-IMP: NORMAL

## 2017-07-04 NOTE — PROGRESS NOTES
3:03 PM  07/04/17    Pt received Levaquin. Culture showed sensitivity. Appropriate abx.      Shannon Peña PA-C

## 2017-09-14 ENCOUNTER — HOSPITAL ENCOUNTER (OUTPATIENT)
Dept: LAB | Age: 27
Discharge: HOME | End: 2017-09-14

## 2017-09-14 LAB — SENTARA SPECIMEN COL,SENBCF: NORMAL

## 2017-09-14 PROCEDURE — 99001 SPECIMEN HANDLING PT-LAB: CPT | Performed by: PSYCHIATRY & NEUROLOGY

## 2018-02-28 ENCOUNTER — APPOINTMENT (OUTPATIENT)
Dept: GENERAL RADIOLOGY | Age: 28
End: 2018-02-28
Attending: PHYSICIAN ASSISTANT
Payer: MEDICAID

## 2018-02-28 ENCOUNTER — HOSPITAL ENCOUNTER (EMERGENCY)
Age: 28
Discharge: HOME | End: 2018-02-28
Attending: EMERGENCY MEDICINE
Payer: MEDICAID

## 2018-02-28 VITALS
OXYGEN SATURATION: 90 % | TEMPERATURE: 96.8 F | DIASTOLIC BLOOD PRESSURE: 93 MMHG | RESPIRATION RATE: 20 BRPM | HEIGHT: 60 IN | SYSTOLIC BLOOD PRESSURE: 122 MMHG | HEART RATE: 54 BPM | WEIGHT: 140 LBS | BODY MASS INDEX: 27.48 KG/M2

## 2018-02-28 DIAGNOSIS — R09.02 HYPOXIA: ICD-10-CM

## 2018-02-28 DIAGNOSIS — T17.800A ASPIRATION INTO LOWER RESPIRATORY TRACT, INITIAL ENCOUNTER: Primary | ICD-10-CM

## 2018-02-28 LAB
ALBUMIN SERPL-MCNC: 3.8 G/DL (ref 3.4–5)
ALBUMIN/GLOB SERPL: 0.8 {RATIO} (ref 0.8–1.7)
ALP SERPL-CCNC: 75 U/L (ref 45–117)
ALT SERPL-CCNC: 21 U/L (ref 16–61)
ANION GAP SERPL CALC-SCNC: 10 MMOL/L (ref 3–18)
AST SERPL-CCNC: 25 U/L (ref 15–37)
BASOPHILS # BLD: 0 K/UL (ref 0–0.06)
BASOPHILS NFR BLD: 0 % (ref 0–2)
BILIRUB SERPL-MCNC: 0.8 MG/DL (ref 0.2–1)
BUN SERPL-MCNC: 14 MG/DL (ref 7–18)
BUN/CREAT SERPL: 27 (ref 12–20)
CALCIUM SERPL-MCNC: 9.7 MG/DL (ref 8.5–10.1)
CHLORIDE SERPL-SCNC: 103 MMOL/L (ref 100–108)
CO2 SERPL-SCNC: 26 MMOL/L (ref 21–32)
CREAT SERPL-MCNC: 0.52 MG/DL (ref 0.6–1.3)
DIFFERENTIAL METHOD BLD: ABNORMAL
EOSINOPHIL # BLD: 0.1 K/UL (ref 0–0.4)
EOSINOPHIL NFR BLD: 2 % (ref 0–5)
ERYTHROCYTE [DISTWIDTH] IN BLOOD BY AUTOMATED COUNT: 13.7 % (ref 11.6–14.5)
GLOBULIN SER CALC-MCNC: 4.5 G/DL (ref 2–4)
GLUCOSE SERPL-MCNC: 87 MG/DL (ref 74–99)
HCT VFR BLD AUTO: 47.3 % (ref 36–48)
HGB BLD-MCNC: 15.8 G/DL (ref 13–16)
LYMPHOCYTES # BLD: 1.7 K/UL (ref 0.9–3.6)
LYMPHOCYTES NFR BLD: 35 % (ref 21–52)
MCH RBC QN AUTO: 27.6 PG (ref 24–34)
MCHC RBC AUTO-ENTMCNC: 33.4 G/DL (ref 31–37)
MCV RBC AUTO: 82.7 FL (ref 74–97)
MONOCYTES # BLD: 0.3 K/UL (ref 0.05–1.2)
MONOCYTES NFR BLD: 7 % (ref 3–10)
NEUTS SEG # BLD: 2.7 K/UL (ref 1.8–8)
NEUTS SEG NFR BLD: 56 % (ref 40–73)
PLATELET # BLD AUTO: 153 K/UL (ref 135–420)
PMV BLD AUTO: 10.8 FL (ref 9.2–11.8)
POTASSIUM SERPL-SCNC: 4 MMOL/L (ref 3.5–5.5)
PROT SERPL-MCNC: 8.3 G/DL (ref 6.4–8.2)
RBC # BLD AUTO: 5.72 M/UL (ref 4.7–5.5)
SODIUM SERPL-SCNC: 139 MMOL/L (ref 136–145)
WBC # BLD AUTO: 4.8 K/UL (ref 4.6–13.2)

## 2018-02-28 PROCEDURE — 80053 COMPREHEN METABOLIC PANEL: CPT | Performed by: PHYSICIAN ASSISTANT

## 2018-02-28 PROCEDURE — 71045 X-RAY EXAM CHEST 1 VIEW: CPT

## 2018-02-28 PROCEDURE — 99285 EMERGENCY DEPT VISIT HI MDM: CPT

## 2018-02-28 PROCEDURE — 94762 N-INVAS EAR/PLS OXIMTRY CONT: CPT

## 2018-02-28 PROCEDURE — 74011250637 HC RX REV CODE- 250/637: Performed by: PHYSICIAN ASSISTANT

## 2018-02-28 PROCEDURE — 85025 COMPLETE CBC W/AUTO DIFF WBC: CPT | Performed by: PHYSICIAN ASSISTANT

## 2018-02-28 RX ORDER — ALBUTEROL SULFATE 0.83 MG/ML
2.5 SOLUTION RESPIRATORY (INHALATION)
Qty: 24 EACH | Refills: 0 | Status: SHIPPED | OUTPATIENT
Start: 2018-02-28 | End: 2018-06-07

## 2018-02-28 RX ORDER — LEVOFLOXACIN 750 MG/1
750 TABLET ORAL
Status: COMPLETED | OUTPATIENT
Start: 2018-02-28 | End: 2018-02-28

## 2018-02-28 RX ORDER — LEVOFLOXACIN 750 MG/1
750 TABLET ORAL DAILY
Qty: 6 TAB | Refills: 0 | Status: SHIPPED | OUTPATIENT
Start: 2018-02-28 | End: 2018-06-07

## 2018-02-28 RX ORDER — METRONIDAZOLE 250 MG/1
500 TABLET ORAL
Status: COMPLETED | OUTPATIENT
Start: 2018-02-28 | End: 2018-02-28

## 2018-02-28 RX ADMIN — LEVOFLOXACIN 750 MG: 750 TABLET, FILM COATED ORAL at 14:37

## 2018-02-28 RX ADMIN — METRONIDAZOLE 500 MG: 250 TABLET ORAL at 14:37

## 2018-02-28 NOTE — ED NOTES
Assumed care of patient. Patient presents with complaints of possible aspiration. Caregiver reports patient was taking a shower this morning and water got in patients mouth and tracheostomy. Nurse attempted to suction fluid out, but came to hospital to rule out the aspiration. Wheezing noted in patient. Patient changed into gown and warm blankets provided. Patient placed on cardiac monitor and continuous pulse ox. Call bell within reach of patient. Will continue to monitor and assess.

## 2018-02-28 NOTE — ED TRIAGE NOTES
Caregiver reports patient was taking a shower this morning when water got into his mouth and track. Caregiver reports patient had momentary color change and SpO2 sats dropped to 82% via trach on 3L. Patient's caregiver reports she suctioned trach and bumped patient's oxygen up to 5L, gave him an albuterol treatment, and used a percussive vest but has been unable to maintain his SpO2 sats when trying to wean him back to 3L. Sepsis Screening completed    (  )Patient meets SIRS criteria. ( x )Patient does not meet SIRS criteria.       SIRS Criteria is achieved when two or more of the following are present   Temperature < 96.8°F (36°C) or > 100.9°F (38.3°C)   Heart Rate > 90 beats per minute   Respiratory Rate > 20 breaths per minute   WBC count > 12,000 or <4,000 or > 10% bands

## 2018-02-28 NOTE — ED PROVIDER NOTES
EMERGENCY DEPARTMENT HISTORY AND PHYSICAL EXAM    Date: 2/28/2018  Patient Name: Judith Vasquez    History of Presenting Illness     Chief Complaint   Patient presents with    Aspiration         History Provided By: Caregiver    Chief Complaint: aspiration  Duration: this morning  Timing:  Acute  Modifying Factors: Caregiver suctioned trach  Associated Symptoms: No associated symptoms    Additional History (Context):   12:00 PM  Judith Vasquez is a 32 y.o. male with PMHX of fiona QUEEN who presents to the emergency department C/O aspiration onset this morning. Caregiver reports patient was taking a shower this morning when water got into his mouth and trach. Caregiver reports patient had momentary color change and SpO2 sats dropped to 82% via trach on 3L. Patient's caregiver reports she suctioned 1/2 canister out of trach and bumped patient's oxygen up to 5L, gave him an albuterol treatment, and used a percussive vest but has been unable to maintain his SpO2. SpO2 has been in the 80th percentile on 5L. Baseline SpO2 93% on 3L. No associated symptoms. Caregiver denies fever and any other Sx or complaints. HPI is limited due to patient nonverbal.     PCP: Jose Hollingsworth MD    Current Outpatient Prescriptions   Medication Sig Dispense Refill    levoFLOXacin (LEVAQUIN) 750 mg tablet Take 1 Tab by mouth daily. 6 Tab 0    albuterol (PROVENTIL VENTOLIN) 2.5 mg /3 mL (0.083 %) nebulizer solution 3 mL by Nebulization route every four (4) hours as needed for Wheezing. 24 Each 0    carbamide peroxide (DEBROX) 6.5 % otic solution Administer 5 Drops in right ear two (2) times a day. Indications: 5 drops right ear for 5 days each month      formoterol (PERFOROMIST) 20 mcg/2 mL nebu neb solution by Nebulization route once.  polyethylene glycol (MIRALAX) 17 gram packet Take 17 g by mouth daily.  diazePAM (DIASTAT ACUDIAL) 5-7.5-10 mg kit Insert 5 mg into rectum now.       diazePAM (VALIUM) 5 mg tablet Take 5 mg by mouth every six (6) hours as needed for Anxiety.  fluocinoNIDE (LIDEX) 0.05 % ointment Apply  to affected area two (2) times a day.  sodium chloride 0.9 % nebu 2.5 mL by Nebulization route as needed.  Menthol-Zinc Oxide 0.4-20 % pste by Apply Externally route.  diazepam (VALIUM) 2 mg tablet 1.5 Tabs by Per G Tube route four (4) times daily. Max Daily Amount: 12 mg. 12 Tab 0    PHENobarbital (LUMINAL) 32.4 mg tablet 1 Tab by Per G Tube route two (2) times a day. Max Daily Amount: 64.8 mg. 10 Tab 0    valproic acid, as sodium salt, (DEPAKENE) 250 mg/5 mL (5 mL) soln oral solution 15 mL by Per G Tube route two (2) times a day. 150 mL 0    acetaminophen (TYLENOL) 325 mg tablet Take 650 mg by mouth every four (4) hours as needed for Pain or Fever.  QUEtiapine (SEROQUEL) 25 mg tablet Take 25 mg by mouth nightly as needed.  albuterol-ipratropium (DUO-NEB) 2.5 mg-0.5 mg/3 ml nebulizer solution 3 mL by Nebulization route every four (4) hours as needed. (Patient taking differently: 3 mL by Nebulization route every six (6) hours. ) 30 Vial 0    acetylcysteine (MUCOMYST) 100 mg/mL (10 %) nebulizer solution Take 4 mL by inhalation four (4) times daily. 30 mL 0    EPINEPHrine (EPIPEN) 0.3 mg/0.3 mL injection 0.3 mg by IntraMUSCular route once as needed.  levETIRAcetam (KEPPRA) 100 mg/mL solution 1,500 mg by PEG Tube route two (2) times a day.  budesonide (PULMICORT) 0.5 mg/2 mL nebulizer suspension 500 mcg by Nebulization route two (2) times a day.  baclofen (LIORESAL) 10 mg tablet Take 10 mg by mouth three (3) times daily.  nystatin (MYCOSTATIN) 100,000 unit/g ointment Apply  to affected area two (2) times a day.  multivit,tx w/iron, hematinic, (B PLEX PLUS) 27-0.8 mg Tab Take 1 Tab by mouth daily.  cetirizine (ZYRTEC) 10 mg tablet Take  by mouth daily.         magnesium hydroxide (CONTRERAS MILK OF MAGNESIA) 400 mg/5 mL suspension Take 15 mL by mouth daily.      miconazole (MICOTIN) 2 % topical cream Apply  to affected area two (2) times daily as needed.  bacitracin (BACITRACIN) 500 unit/g Oint Apply  to affected area three (3) times daily.  bisacodyl (DULCOLAX) 10 mg suppository Insert 10 mg into rectum daily. Past History     Past Medical History:  Past Medical History:   Diagnosis Date    Cerebral palsy ([address]     Cholesteatoma     Cystitis     Developmental delay     Severe MR secondary to prematurity    DIC (disseminated intravascular coagulation) (HCC)     Dysphagia     Eczema     Fracture     GERD (gastroesophageal reflux disease)     Hearing loss     Sensorineural    Heart abnormality     PDA    Hip dislocation, bilateral (HCC)     HX OTHER MEDICAL     Hyaline membrane disease     Incontinence     urianry and fecal    Influenza     MR (mental retardation)     severe/profound secondary to prematurity    MRSA (methicillin resistant staph aureus) culture positive     MRSA infection     Bactrium Resistant    Neurogenic bladder     PDA (patent ductus arteriosus)     Pneumonia     Premature birth     Psychiatric disorder     Mental Retardation    Respiratory abnormalities     Intubation    Seizures (HCC)     Sleep apnea     Spasticity     Subglottic stenosis     mild    Tracheitis     Vision decreased     Visual field defect        Past Surgical History:  Past Surgical History:   Procedure Laterality Date    ABDOMEN SURGERY PROC UNLISTED      18g Dale Tube for Feeding    HX HEENT      Tympanomastoidectomy    HX ORTHOPAEDIC      Hamstring Release, psoas and adductor release, rhizotomy, fracture repairs    HX TONSIL AND ADENOIDECTOMY      HX TRACHEOSTOMY         Family History:  History reviewed. No pertinent family history. Social History:  Social History   Substance Use Topics    Smoking status: Never Smoker    Smokeless tobacco: Never Used    Alcohol use No       Allergies:   Allergies   Allergen Reactions    Amoxicillin Rash    Ampicillin Unknown (comments)    Augmentin [Amoxicillin-Pot Clavulanate] Rash    Banana Rash    Carbamide Peroxide Unknown (comments)    Clindamycin Rash    Genoptic [Gentamicin] Rash    Oatmeal Anti-Itch [Pramoxine-Calamine-Camphor] Rash    Peach (Prunus Persica) Other (comments)     Unknown      Peanut Butter Flavor Rash    Rice Rash    Rocephin [Ceftriaxone] Rash    Tobrex [Tobramycin Sulfate] Rash    Zarontin [Ethosuximide] Unknown (comments)    Zithromax [Azithromycin] Rash         Review of Systems   Review of Systems   Unable to perform ROS: Patient nonverbal   Respiratory:        (+) aspiration       Physical Exam     Vitals:    02/28/18 1145 02/28/18 1211   BP: (!) 122/93    Pulse: (!) 54    Resp: 20    Temp: 96.8 °F (36 °C)    SpO2: 97% 90%   Weight: 63.5 kg (140 lb)    Height: 5' (1.524 m)      Physical Exam   Constitutional: He appears well-nourished. No distress. Reclined in wheelchair retracted extremities, tach colar in place, CP features   HENT:   Head: Atraumatic. Eyes: Conjunctivae and EOM are normal. Pupils are equal, round, and reactive to light. Neck: Normal range of motion. Neck supple. Cardiovascular: Normal rate and regular rhythm. Pulmonary/Chest: Effort normal. No respiratory distress. He has no wheezes. Harsh breath sounds through out    Musculoskeletal: He exhibits no edema. Neurological: He is alert. Skin: Skin is warm and dry. Nursing note and vitals reviewed.         Diagnostic Study Results     Labs -     Recent Results (from the past 12 hour(s))   CBC WITH AUTOMATED DIFF    Collection Time: 02/28/18 12:53 PM   Result Value Ref Range    WBC 4.8 4.6 - 13.2 K/uL    RBC 5.72 (H) 4.70 - 5.50 M/uL    HGB 15.8 13.0 - 16.0 g/dL    HCT 47.3 36.0 - 48.0 %    MCV 82.7 74.0 - 97.0 FL    MCH 27.6 24.0 - 34.0 PG    MCHC 33.4 31.0 - 37.0 g/dL    RDW 13.7 11.6 - 14.5 %    PLATELET 255 485 - 854 K/uL    MPV 10.8 9.2 - 11.8 FL NEUTROPHILS 56 40 - 73 %    LYMPHOCYTES 35 21 - 52 %    MONOCYTES 7 3 - 10 %    EOSINOPHILS 2 0 - 5 %    BASOPHILS 0 0 - 2 %    ABS. NEUTROPHILS 2.7 1.8 - 8.0 K/UL    ABS. LYMPHOCYTES 1.7 0.9 - 3.6 K/UL    ABS. MONOCYTES 0.3 0.05 - 1.2 K/UL    ABS. EOSINOPHILS 0.1 0.0 - 0.4 K/UL    ABS. BASOPHILS 0.0 0.0 - 0.06 K/UL    DF AUTOMATED     METABOLIC PANEL, COMPREHENSIVE    Collection Time: 02/28/18 12:53 PM   Result Value Ref Range    Sodium 139 136 - 145 mmol/L    Potassium 4.0 3.5 - 5.5 mmol/L    Chloride 103 100 - 108 mmol/L    CO2 26 21 - 32 mmol/L    Anion gap 10 3.0 - 18 mmol/L    Glucose 87 74 - 99 mg/dL    BUN 14 7.0 - 18 MG/DL    Creatinine 0.52 (L) 0.6 - 1.3 MG/DL    BUN/Creatinine ratio 27 (H) 12 - 20      GFR est AA >60 >60 ml/min/1.73m2    GFR est non-AA >60 >60 ml/min/1.73m2    Calcium 9.7 8.5 - 10.1 MG/DL    Bilirubin, total 0.8 0.2 - 1.0 MG/DL    ALT (SGPT) 21 16 - 61 U/L    AST (SGOT) 25 15 - 37 U/L    Alk. phosphatase 75 45 - 117 U/L    Protein, total 8.3 (H) 6.4 - 8.2 g/dL    Albumin 3.8 3.4 - 5.0 g/dL    Globulin 4.5 (H) 2.0 - 4.0 g/dL    A-G Ratio 0.8 0.8 - 1.7         Radiologic Studies -   XR CHEST PORT   Final Result   IMPRESSION:     1. Findings suspicious for asymmetric right perihilar infiltrate. This could be  secondary to aspiration. As read by the radiologist.     CT Results  (Last 48 hours)    None        CXR Results  (Last 48 hours)               02/28/18 1340  XR CHEST PORT Final result    Impression:  IMPRESSION:           1. Findings suspicious for asymmetric right perihilar infiltrate. This could be   secondary to aspiration. Narrative:  EXAM: Chest portable       INDICATION: Aspirated portable tracheostomy tube. Hypoxia       COMPARISON: Single view chest 6/28/2017       _______________       FINDINGS:       AP portable chest film was performed. There are asymmetric markings in the right   perihilar region both superiorly and inferiorly.  These are new compared to prior   exam. Left lung is clear. No effusion or pneumothorax. Heart and pulmonary   vascularity are normal. Tracheostomy tube is present in good position. _______________                 Medications given in the ED-  Medications   levoFLOXacin (LEVAQUIN) tablet 750 mg (750 mg Oral Given 2/28/18 1437)   metroNIDAZOLE (FLAGYL) tablet 500 mg (500 mg Oral Given 2/28/18 1437)         Medical Decision Making   I am the first provider for this patient. I reviewed the vital signs, available nursing notes, past medical history, past surgical history, family history and social history. Vital Signs-Reviewed the patient's vital signs. Pulse Oximetry Analysis - 97% on 5L    Records Reviewed: Nursing Notes and Old Medical Records    Procedures:  Procedures    ED Course:   12:00 PM Initial assessment performed. The patients presenting problems have been discussed, and they are in agreement with the care plan formulated and outlined with them. I have encouraged them to ask questions as they arise throughout their visit. CONSULT NOTE:   2:05 PM  Joey Lamb PA-C spoke with Jan Patten MD   Specialty: ED attending  Discussed pt's hx, disposition, and available diagnostic and imaging results. Consulting physician suggest Levaquin and Flagyl. O2 sats have been in upper 90s with his oxygen trach collar back at baseline of 3L. He appears well and has adequate home care. CONSULT NOTE:   2:37 PM  Joey Lamb PA-C spoke with Jazmin Corbin DO   Specialty: Internal Medicine  Discussed pt's hx, disposition, and available diagnostic and imaging results. Consulting physician states he will be happy to see patient for follow up. He will call the nurse call patient to make an appointment. Agrees with Stefan Stallworth since hes tolerated well for similar occurrence in past and does not think Flagyl will be necessary. Request that I ensure enough Albuterol for nebulizer at home.        Diagnosis and Disposition       DISCHARGE NOTE:  2:43 PM  Aura Maya results have been reviewed with his caregiver. She has been counseled regarding diagnosis, treatment, and plan. She verbally conveys understanding and agreement of the signs, symptoms, diagnosis, treatment and prognosis and additionally agrees to follow up as discussed. She also agrees with the care-plan and conveys that all of her questions have been answered. I have also provided discharge instructions that include: educational information regarding the diagnosis and treatment, and list of reasons why they would want to return to the ED prior to their follow-up appointment, should his condition change. DISCUSSION:  32year old male presents to ED by caregivers c/o low oxygen sats. Patient is CP patient, has trachea collar, and uses 3L of oxygen at baseline. During shower this morning, patient got water in the mouth and neck and seemed to have a brief period of hypoxia. Oxygen sats per nurse in the mid [de-identified]. Patient was suction immediately and oxygen bumped to 5L. Pt arrives to ED with oxygen sats in mid 80 on 5L via trach collar. Repository was called for suctioning. Lab within normal limits. CXR shows aspiration. Pt oxygen was reduced back to his baseline of 3L via collar and O2 sats have remained in the mid and upper 90s. Hes doing well and at baseline per caregiver. He does have extensive allergies. Plan will be for Levaquin and Flagyl. Prompt follow up PCP and pulmonologist. Return precautions provided. CLINICAL IMPRESSION:    1. Aspiration into lower respiratory tract, initial encounter    2. Hypoxia        PLAN:  1. D/C Home  2. Current Discharge Medication List      CONTINUE these medications which have CHANGED    Details   levoFLOXacin (LEVAQUIN) 750 mg tablet Take 1 Tab by mouth daily. Qty: 6 Tab, Refills: 0      albuterol (PROVENTIL VENTOLIN) 2.5 mg /3 mL (0.083 %) nebulizer solution 3 mL by Nebulization route every four (4) hours as needed for Wheezing.   Qty: 24 Each, Refills: 0           3. Follow-up Information     Follow up With Details Comments Sera2 Fabi De La Vega MD Schedule an appointment as soon as possible for a visit in 1 day For primary care follow up [address] [facility]. [address] [place] [address]      THE [facility] EMERGENCY DEPT Go to As needed, as symptoms worsen [address] [place] [address]  [phone]        _______________________________    Attestations: This note is prepared by Brayden Cruz, acting as Scribe for Jayson Callahan PA-C. Jayson Callahan PA-C:  The scribe's documentation has been prepared under my direction and personally reviewed by me in its entirety.   I confirm that the note above accurately reflects all work, treatment, procedures, and medical decision making performed by me.  _______________________________

## 2018-02-28 NOTE — DISCHARGE INSTRUCTIONS
Learning About Hypoxemia  What is hypoxemia? Hypoxemia means that you don't have enough oxygen in your blood. It's a result of diseases that affect your heart or lungs. These include heart failure, COPD, and pulmonary fibrosis (scarring of the lungs). Being at high altitudes can also lead to hypoxemia. What happens when you have hypoxemia? Oxygen gets into your blood through your lungs. Your blood carries the oxygen to all parts of your body. When you have too little oxygen in your blood, your body doesn't get enough of it. With too little oxygen, your heart and other parts of your body don't work very well. What are the symptoms? In addition to the symptoms of whatever is causing your hypoxemia, you may:  · Get tired quickly. · Be short of breath when you are active. · Feel like your heart is pounding or racing. · Feel weak or dizzy. · Become confused. How is hypoxemia treated? Your doctor will do tests to find out how much oxygen is in your blood. He or she will look for the cause of your hypoxemia and treat that problem. For example, if you have heart failure, you may need medicines that help your heart pump better. · If your hypoxemia is not severe, your doctor may give you oxygen through a mask or nasal cannula (say \"KAROLINE-danielleh-nida\"). A cannula is a thin tube with two openings that fit just inside your nose. · If your hypoxemia is severe, you may have a breathing tube put into your windpipe. The breathing tube is attached to a machine that pushes air into your lungs. This machine is called a ventilator. · If you have a long-term problem with hypoxemia, your doctor may recommend that you use oxygen regularly. Some people need it all the time. Others need it from time to time throughout the day or overnight. Your doctor will tell you how much oxygen you need and how often to use it. Follow-up care is a key part of your treatment and safety.  Be sure to make and go to all appointments, and call your doctor if you are having problems. It's also a good idea to know your test results and keep a list of the medicines you take. Where can you learn more? Go to http://boaz-alee.info/. Enter M375 in the search box to learn more about \"Learning About Hypoxemia. \"  Current as of: May 12, 2017  Content Version: 11.4  © 9430-8426 [employer]. Care instructions adapted under license by [employer] (which disclaims liability or warranty for this information). If you have questions about a medical condition or this instruction, always ask your healthcare professional. [place] [address] any warranty or liability for your use of this information.

## 2018-02-28 NOTE — ED NOTES
I have reviewed discharge instructions with the guardian/caretaker. The guardian/caretaker verbalized understanding. Patient armband removed and shredded    2 prescriptions given and reviewed with patient.     Patient d/c home in stable condition

## 2018-03-13 ENCOUNTER — APPOINTMENT (OUTPATIENT)
Dept: GENERAL RADIOLOGY | Age: 28
End: 2018-03-13
Attending: EMERGENCY MEDICINE
Payer: MEDICAID

## 2018-03-13 ENCOUNTER — HOSPITAL ENCOUNTER (EMERGENCY)
Age: 28
Discharge: HOME | End: 2018-03-13
Attending: EMERGENCY MEDICINE
Payer: MEDICAID

## 2018-03-13 VITALS
OXYGEN SATURATION: 88 % | BODY MASS INDEX: 24.61 KG/M2 | TEMPERATURE: 98.3 F | RESPIRATION RATE: 22 BRPM | SYSTOLIC BLOOD PRESSURE: 139 MMHG | DIASTOLIC BLOOD PRESSURE: 85 MMHG | WEIGHT: 126 LBS | HEART RATE: 103 BPM

## 2018-03-13 DIAGNOSIS — J69.0 ASPIRATION PNEUMONIA OF RIGHT MIDDLE LOBE, UNSPECIFIED ASPIRATION PNEUMONIA TYPE (HCC): Primary | ICD-10-CM

## 2018-03-13 LAB
ALBUMIN SERPL-MCNC: 3.5 G/DL (ref 3.4–5)
ALBUMIN/GLOB SERPL: 0.9 {RATIO} (ref 0.8–1.7)
ALP SERPL-CCNC: 66 U/L (ref 45–117)
ALT SERPL-CCNC: 20 U/L (ref 16–61)
ANION GAP SERPL CALC-SCNC: 6 MMOL/L (ref 3–18)
APPEARANCE UR: CLEAR
AST SERPL-CCNC: 20 U/L (ref 15–37)
BASOPHILS # BLD: 0 K/UL (ref 0–0.06)
BASOPHILS NFR BLD: 0 % (ref 0–2)
BILIRUB SERPL-MCNC: 0.8 MG/DL (ref 0.2–1)
BILIRUB UR QL: NEGATIVE
BUN SERPL-MCNC: 10 MG/DL (ref 7–18)
BUN/CREAT SERPL: 18 (ref 12–20)
CALCIUM SERPL-MCNC: 9.4 MG/DL (ref 8.5–10.1)
CHLORIDE SERPL-SCNC: 99 MMOL/L (ref 100–108)
CO2 SERPL-SCNC: 30 MMOL/L (ref 21–32)
COLOR UR: YELLOW
CREAT SERPL-MCNC: 0.57 MG/DL (ref 0.6–1.3)
DIFFERENTIAL METHOD BLD: ABNORMAL
EOSINOPHIL # BLD: 0 K/UL (ref 0–0.4)
EOSINOPHIL NFR BLD: 0 % (ref 0–5)
ERYTHROCYTE [DISTWIDTH] IN BLOOD BY AUTOMATED COUNT: 13.8 % (ref 11.6–14.5)
FLUAV AG NPH QL IA: NEGATIVE
FLUBV AG NOSE QL IA: NEGATIVE
GLOBULIN SER CALC-MCNC: 4 G/DL (ref 2–4)
GLUCOSE SERPL-MCNC: 97 MG/DL (ref 74–99)
GLUCOSE UR STRIP.AUTO-MCNC: NEGATIVE MG/DL
HCT VFR BLD AUTO: 48.1 % (ref 36–48)
HGB BLD-MCNC: 16.8 G/DL (ref 13–16)
HGB UR QL STRIP: NEGATIVE
KETONES UR QL STRIP.AUTO: NEGATIVE MG/DL
LACTATE SERPL-SCNC: 0.9 MMOL/L (ref 0.4–2)
LEUKOCYTE ESTERASE UR QL STRIP.AUTO: NEGATIVE
LYMPHOCYTES # BLD: 2.6 K/UL (ref 0.9–3.6)
LYMPHOCYTES NFR BLD: 27 % (ref 21–52)
MAGNESIUM SERPL-MCNC: 1.9 MG/DL (ref 1.6–2.6)
MCH RBC QN AUTO: 27.8 PG (ref 24–34)
MCHC RBC AUTO-ENTMCNC: 34.9 G/DL (ref 31–37)
MCV RBC AUTO: 79.6 FL (ref 74–97)
MONOCYTES # BLD: 1.6 K/UL (ref 0.05–1.2)
MONOCYTES NFR BLD: 16 % (ref 3–10)
NEUTS SEG # BLD: 5.6 K/UL (ref 1.8–8)
NEUTS SEG NFR BLD: 57 % (ref 40–73)
NITRITE UR QL STRIP.AUTO: NEGATIVE
PH UR STRIP: 7 [PH] (ref 5–8)
PLATELET # BLD AUTO: 120 K/UL (ref 135–420)
PMV BLD AUTO: 12 FL (ref 9.2–11.8)
POTASSIUM SERPL-SCNC: 3.9 MMOL/L (ref 3.5–5.5)
PROT SERPL-MCNC: 7.5 G/DL (ref 6.4–8.2)
PROT UR STRIP-MCNC: NEGATIVE MG/DL
RBC # BLD AUTO: 6.04 M/UL (ref 4.7–5.5)
SODIUM SERPL-SCNC: 135 MMOL/L (ref 136–145)
SP GR UR REFRACTOMETRY: 1.01 (ref 1–1.03)
UROBILINOGEN UR QL STRIP.AUTO: 1 EU/DL (ref 0.2–1)
WBC # BLD AUTO: 9.8 K/UL (ref 4.6–13.2)

## 2018-03-13 PROCEDURE — 94640 AIRWAY INHALATION TREATMENT: CPT

## 2018-03-13 PROCEDURE — 71045 X-RAY EXAM CHEST 1 VIEW: CPT

## 2018-03-13 PROCEDURE — 85025 COMPLETE CBC W/AUTO DIFF WBC: CPT | Performed by: EMERGENCY MEDICINE

## 2018-03-13 PROCEDURE — 80053 COMPREHEN METABOLIC PANEL: CPT | Performed by: EMERGENCY MEDICINE

## 2018-03-13 PROCEDURE — 87040 BLOOD CULTURE FOR BACTERIA: CPT | Performed by: EMERGENCY MEDICINE

## 2018-03-13 PROCEDURE — 96365 THER/PROPH/DIAG IV INF INIT: CPT

## 2018-03-13 PROCEDURE — 77030013140 HC MSK NEB VYRM -A

## 2018-03-13 PROCEDURE — 81003 URINALYSIS AUTO W/O SCOPE: CPT | Performed by: EMERGENCY MEDICINE

## 2018-03-13 PROCEDURE — 87804 INFLUENZA ASSAY W/OPTIC: CPT | Performed by: EMERGENCY MEDICINE

## 2018-03-13 PROCEDURE — 74011250636 HC RX REV CODE- 250/636: Performed by: EMERGENCY MEDICINE

## 2018-03-13 PROCEDURE — 83735 ASSAY OF MAGNESIUM: CPT | Performed by: EMERGENCY MEDICINE

## 2018-03-13 PROCEDURE — 74011000250 HC RX REV CODE- 250: Performed by: EMERGENCY MEDICINE

## 2018-03-13 PROCEDURE — 83605 ASSAY OF LACTIC ACID: CPT | Performed by: EMERGENCY MEDICINE

## 2018-03-13 PROCEDURE — 99284 EMERGENCY DEPT VISIT MOD MDM: CPT

## 2018-03-13 PROCEDURE — 51701 INSERT BLADDER CATHETER: CPT

## 2018-03-13 PROCEDURE — 77030011943

## 2018-03-13 RX ORDER — LEVOFLOXACIN 5 MG/ML
750 INJECTION, SOLUTION INTRAVENOUS ONCE
Status: COMPLETED | OUTPATIENT
Start: 2018-03-13 | End: 2018-03-13

## 2018-03-13 RX ORDER — IPRATROPIUM BROMIDE AND ALBUTEROL SULFATE 2.5; .5 MG/3ML; MG/3ML
3 SOLUTION RESPIRATORY (INHALATION)
Status: COMPLETED | OUTPATIENT
Start: 2018-03-13 | End: 2018-03-13

## 2018-03-13 RX ORDER — LEVOFLOXACIN 750 MG/1
750 TABLET ORAL DAILY
Qty: 10 TAB | Refills: 0 | Status: SHIPPED | OUTPATIENT
Start: 2018-03-13 | End: 2018-03-23

## 2018-03-13 RX ADMIN — LEVOFLOXACIN 750 MG: 5 INJECTION, SOLUTION INTRAVENOUS at 13:52

## 2018-03-13 RX ADMIN — IPRATROPIUM BROMIDE AND ALBUTEROL SULFATE 3 ML: .5; 3 SOLUTION RESPIRATORY (INHALATION) at 11:34

## 2018-03-13 NOTE — ED PROVIDER NOTES
EMERGENCY DEPARTMENT HISTORY AND PHYSICAL EXAM    Date: 3/13/2018  Patient Name: Verona Mccormick    History of Presenting Illness     Chief Complaint   Patient presents with    Fever         History Provided By: Caregiver    Chief Complaint: Fever  Duration: 2 Days  Timing:  Acute  Location: N/A  Severity: max 101.7 F, 98.3 F in ED  Modifying Factors: Some alleviation Tylenol and Valium. Associated Symptoms: SOB, increased thick secretions, and nasal congestion    Additional History (Context):   11:09 AM  Verona Mccormick is a 32 y.o. male with PMHx of MR, fiona collar, subglottic stenosis, cerebral palsy, seizures, MRSA, PDA, and pneumonia who presents to the emergency department C/O fever (max 101.7 F, 98.3 F in ED) onset 2 days ago. Associated sxs include SOB, increased thick secretions, and nasal congestion. Some alleviation withTylenol and Valium. Pt was seen a this facility on 2/28 and was placed on Levaquin, which he finished 3-4 days ago. Pt destated to 84% last night but his caregivers were able to get his O2 stats back up to 92% this morning. Pt was seen at [facility] office this morning, had O2 stats at 85% on 6 L and was sent to this facility for further evaluation. Caregiver denies any other sxs or complaints. Pt is DNR per caregiver. PCP: Kati Bearden MD    Current Facility-Administered Medications   Medication Dose Route Frequency Provider Last Rate Last Dose    levoFLOXacin (LEVAQUIN) 750 mg in D5W IVPB  750 mg IntraVENous Prashant Bryson  mL/hr at 03/13/18 1352 750 mg at 03/13/18 1352     Current Outpatient Prescriptions   Medication Sig Dispense Refill    levoFLOXacin (LEVAQUIN) 750 mg tablet Take 1 Tab by mouth daily for 10 days. 10 Tab 0    levoFLOXacin (LEVAQUIN) 750 mg tablet Take 1 Tab by mouth daily. 6 Tab 0    albuterol (PROVENTIL VENTOLIN) 2.5 mg /3 mL (0.083 %) nebulizer solution 3 mL by Nebulization route every four (4) hours as needed for Wheezing.  24 Each 0  carbamide peroxide (DEBROX) 6.5 % otic solution Administer 5 Drops in right ear two (2) times a day. Indications: 5 drops right ear for 5 days each month      formoterol (PERFOROMIST) 20 mcg/2 mL nebu neb solution by Nebulization route once.  polyethylene glycol (MIRALAX) 17 gram packet Take 17 g by mouth daily.  diazePAM (DIASTAT ACUDIAL) 5-7.5-10 mg kit Insert 5 mg into rectum now.  diazePAM (VALIUM) 5 mg tablet Take 5 mg by mouth every six (6) hours as needed for Anxiety.  fluocinoNIDE (LIDEX) 0.05 % ointment Apply  to affected area two (2) times a day.  sodium chloride 0.9 % nebu 2.5 mL by Nebulization route as needed.  Menthol-Zinc Oxide 0.4-20 % pste by Apply Externally route.  diazepam (VALIUM) 2 mg tablet 1.5 Tabs by Per G Tube route four (4) times daily. Max Daily Amount: 12 mg. 12 Tab 0    PHENobarbital (LUMINAL) 32.4 mg tablet 1 Tab by Per G Tube route two (2) times a day. Max Daily Amount: 64.8 mg. 10 Tab 0    valproic acid, as sodium salt, (DEPAKENE) 250 mg/5 mL (5 mL) soln oral solution 15 mL by Per G Tube route two (2) times a day. 150 mL 0    acetaminophen (TYLENOL) 325 mg tablet Take 650 mg by mouth every four (4) hours as needed for Pain or Fever.  QUEtiapine (SEROQUEL) 25 mg tablet Take 25 mg by mouth nightly as needed.  albuterol-ipratropium (DUO-NEB) 2.5 mg-0.5 mg/3 ml nebulizer solution 3 mL by Nebulization route every four (4) hours as needed. (Patient taking differently: 3 mL by Nebulization route every six (6) hours. ) 30 Vial 0    acetylcysteine (MUCOMYST) 100 mg/mL (10 %) nebulizer solution Take 4 mL by inhalation four (4) times daily. 30 mL 0    EPINEPHrine (EPIPEN) 0.3 mg/0.3 mL injection 0.3 mg by IntraMUSCular route once as needed.  levETIRAcetam (KEPPRA) 100 mg/mL solution 1,500 mg by PEG Tube route two (2) times a day.       budesonide (PULMICORT) 0.5 mg/2 mL nebulizer suspension 500 mcg by Nebulization route two (2) times a day.      baclofen (LIORESAL) 10 mg tablet Take 10 mg by mouth three (3) times daily.  nystatin (MYCOSTATIN) 100,000 unit/g ointment Apply  to affected area two (2) times a day.  multivit,tx w/iron, hematinic, (B PLEX PLUS) 27-0.8 mg Tab Take 1 Tab by mouth daily.  cetirizine (ZYRTEC) 10 mg tablet Take  by mouth daily.  magnesium hydroxide (CONTRERAS MILK OF MAGNESIA) 400 mg/5 mL suspension Take 15 mL by mouth daily.  miconazole (MICOTIN) 2 % topical cream Apply  to affected area two (2) times daily as needed.  bacitracin (BACITRACIN) 500 unit/g Oint Apply  to affected area three (3) times daily.  bisacodyl (DULCOLAX) 10 mg suppository Insert 10 mg into rectum daily.            Past History     Past Medical History:  Past Medical History:   Diagnosis Date    Cerebral palsy ([address]     Cholesteatoma     Cystitis     Developmental delay     Severe MR secondary to prematurity    DIC (disseminated intravascular coagulation) (HCC)     Dysphagia     Eczema     Fracture     GERD (gastroesophageal reflux disease)     Hearing loss     Sensorineural    Heart abnormality     PDA    Hip dislocation, bilateral (HCC)     HX OTHER MEDICAL     Hyaline membrane disease     Incontinence     urianry and fecal    Influenza     MR (mental retardation)     severe/profound secondary to prematurity    MRSA (methicillin resistant staph aureus) culture positive     MRSA infection     Bactrium Resistant    Neurogenic bladder     PDA (patent ductus arteriosus)     Pneumonia     Premature birth     Psychiatric disorder     Mental Retardation    Respiratory abnormalities     Intubation    Seizures (HCC)     Sleep apnea     Spasticity     Subglottic stenosis     mild    Tracheitis     Vision decreased     Visual field defect        Past Surgical History:  Past Surgical History:   Procedure Laterality Date    ABDOMEN SURGERY PROC UNLISTED      18g Dale Tube for Feeding    HX HEENT      Tympanomastoidectomy    HX ORTHOPAEDIC      Hamstring Release, psoas and adductor release, rhizotomy, fracture repairs    HX TONSIL AND ADENOIDECTOMY      HX TRACHEOSTOMY         Family History:  No family history on file. Social History:  Social History   Substance Use Topics    Smoking status: Never Smoker    Smokeless tobacco: Never Used    Alcohol use No       Allergies: Allergies   Allergen Reactions    Amoxicillin Rash    Ampicillin Unknown (comments)    Augmentin [Amoxicillin-Pot Clavulanate] Rash    Banana Rash    Carbamide Peroxide Unknown (comments)    Clindamycin Rash    Genoptic [Gentamicin] Rash    Oatmeal Anti-Itch [Pramoxine-Calamine-Camphor] Rash    Peach (Prunus Persica) Other (comments)     Unknown      Peanut Butter Flavor Rash    Rice Rash    Rocephin [Ceftriaxone] Rash    Tobrex [Tobramycin Sulfate] Rash    Zarontin [Ethosuximide] Unknown (comments)    Zithromax [Azithromycin] Rash         Review of Systems   Review of Systems   Constitutional: Positive for fever. Respiratory: Positive for shortness of breath. (+) Increased thick secreations   All other systems reviewed and are negative. Physical Exam     Vitals:    03/13/18 1115 03/13/18 1134 03/13/18 1445   BP: 129/71  130/86   Pulse: (!) 126  96   Resp: 15  16   Temp: 98.3 °F (36.8 °C)     SpO2: 97% 97% 93%   Weight: 57.2 kg (126 lb)       Physical Exam   Nursing note and vitals reviewed. Constitutional: Alert. Well appearing, no acute distress  Head: Normocephalic, Atraumatic  Eyes: Pupils are equal, round, and reactive to light, disconjugate gaze (baseline)  ENT: Moist mucous membranes, oropharynx clear. Neck: Supple, non-tender, Tracheostomy in place with no significant drainage. Cardiovascular: Regular rate and rhythm, no murmurs, rubs, or gallops  Chest: Normal work of breathing and chest excursion bilaterally. No reproducible chest tenderness.   Lungs: Expiratory wheezing in all lung fields with mildly prolonged expiratory phase. Coarse breath sounds at the bases bilaterally. Abdomen: Soft, non tender, non distended, normoactive bowel sounds. PEG tube in place. Back: No evidence of trauma or deformity. No CVA Tenderness. Extremities: No evidence of trauma or deformity, no LE edema. Contractures of the upper and lower extremities. Skin: Warm and dry  Neuro: Alert and interactive, withdraws all extremities to painful stimuli. Psychiatric: Untestable    Diagnostic Study Results     Labs -     Recent Results (from the past 12 hour(s))   CBC WITH AUTOMATED DIFF    Collection Time: 03/13/18 11:20 AM   Result Value Ref Range    WBC 9.8 4.6 - 13.2 K/uL    RBC 6.04 (H) 4.70 - 5.50 M/uL    HGB 16.8 (H) 13.0 - 16.0 g/dL    HCT 48.1 (H) 36.0 - 48.0 %    MCV 79.6 74.0 - 97.0 FL    MCH 27.8 24.0 - 34.0 PG    MCHC 34.9 31.0 - 37.0 g/dL    RDW 13.8 11.6 - 14.5 %    PLATELET 291 (L) 883 - 420 K/uL    MPV 12.0 (H) 9.2 - 11.8 FL    NEUTROPHILS 57 40 - 73 %    LYMPHOCYTES 27 21 - 52 %    MONOCYTES 16 (H) 3 - 10 %    EOSINOPHILS 0 0 - 5 %    BASOPHILS 0 0 - 2 %    ABS. NEUTROPHILS 5.6 1.8 - 8.0 K/UL    ABS. LYMPHOCYTES 2.6 0.9 - 3.6 K/UL    ABS. MONOCYTES 1.6 (H) 0.05 - 1.2 K/UL    ABS. EOSINOPHILS 0.0 0.0 - 0.4 K/UL    ABS.  BASOPHILS 0.0 0.0 - 0.06 K/UL    DF AUTOMATED     INFLUENZA A & B AG (RAPID TEST)    Collection Time: 03/13/18 11:45 AM   Result Value Ref Range    Influenza A Antigen NEGATIVE  NEG      Influenza B Antigen NEGATIVE  NEG     LACTIC ACID    Collection Time: 03/13/18 12:55 PM   Result Value Ref Range    Lactic acid 0.9 0.4 - 2.0 MMOL/L   MAGNESIUM    Collection Time: 03/13/18 12:55 PM   Result Value Ref Range    Magnesium 1.9 1.6 - 2.6 mg/dL   METABOLIC PANEL, COMPREHENSIVE    Collection Time: 03/13/18 12:55 PM   Result Value Ref Range    Sodium 135 (L) 136 - 145 mmol/L    Potassium 3.9 3.5 - 5.5 mmol/L    Chloride 99 (L) 100 - 108 mmol/L    CO2 30 21 - 32 mmol/L    Anion gap 6 3.0 - 18 mmol/L    Glucose 97 74 - 99 mg/dL    BUN 10 7.0 - 18 MG/DL    Creatinine 0.57 (L) 0.6 - 1.3 MG/DL    BUN/Creatinine ratio 18 12 - 20      GFR est AA >60 >60 ml/min/1.73m2    GFR est non-AA >60 >60 ml/min/1.73m2    Calcium 9.4 8.5 - 10.1 MG/DL    Bilirubin, total 0.8 0.2 - 1.0 MG/DL    ALT (SGPT) 20 16 - 61 U/L    AST (SGOT) 20 15 - 37 U/L    Alk. phosphatase 66 45 - 117 U/L    Protein, total 7.5 6.4 - 8.2 g/dL    Albumin 3.5 3.4 - 5.0 g/dL    Globulin 4.0 2.0 - 4.0 g/dL    A-G Ratio 0.9 0.8 - 1.7     URINALYSIS W/ RFLX MICROSCOPIC    Collection Time: 03/13/18  2:00 PM   Result Value Ref Range    Color YELLOW      Appearance CLEAR      Specific gravity 1.007 1.005 - 1.030      pH (UA) 7.0 5.0 - 8.0      Protein NEGATIVE  NEG mg/dL    Glucose NEGATIVE  NEG mg/dL    Ketone NEGATIVE  NEG mg/dL    Bilirubin NEGATIVE  NEG      Blood NEGATIVE  NEG      Urobilinogen 1.0 0.2 - 1.0 EU/dL    Nitrites NEGATIVE  NEG      Leukocyte Esterase NEGATIVE  NEG         Radiologic Studies -   XR CHEST PORT   Final Result   Impression:  Faint right perihilar and lower lung opacity which may reflect underlying  pneumonitis/airspace disease. Overall distribution is similar to prior exam of  2/28/2018    As read by the radiologist.     CT Results  (Last 48 hours)    None        CXR Results  (Last 48 hours)               03/13/18 1212  XR CHEST PORT Final result    Impression:  Impression:   Faint right perihilar and lower lung opacity which may reflect underlying   pneumonitis/airspace disease. Overall distribution is similar to prior exam of   2/28/2018       Narrative:  Chest, single view       Indication: Fever, cough, increased secretions from tracheostomy catheter       Comparison: Several prior chest radiographs, most recently 2/28/2018       Findings:  Portable upright AP view of the chest was obtained. There is a   tracheostomy catheter which projects in unchanged position. Lung volumes remain   low.  Faint right perihilar and right lower lung opacity noted without   pneumothorax or pleural effusion. Left lung is clear. Cardiac size and   mediastinal contours are normal. No acute osseous abnormality. Medications given in the ED-  Medications   levoFLOXacin (LEVAQUIN) 750 mg in D5W IVPB (750 mg IntraVENous New Bag 3/13/18 1352)   albuterol-ipratropium (DUO-NEB) 2.5 MG-0.5 MG/3 ML (3 mL Nebulization Given 3/13/18 9374)         Medical Decision Making   I am the first provider for this patient. I reviewed the vital signs, available nursing notes, past medical history, past surgical history, family history and social history. Vital Signs-Reviewed the patient's vital signs. Pulse Oximetry Analysis - 97% on 4 L trach     Cardiac Monitor:  Rate: 126 bpm  Rhythm: Sinus tachycardia    Records Reviewed: Nursing Notes and Old Medical Records    Procedures:  Procedures    ED Course:   11:09 AM Initial assessment performed. The patients presenting problems have been discussed, and they are in agreement with the care plan formulated and outlined with them. I have encouraged them to ask questions as they arise throughout their visit. 1:26 PM Discussed patient's history, exam, and available diagnostics results with Tita Chirinos DO, Pt's PCP, who recommends no home IV antibiotics at this time. Discharge with a 10 day course of Levaquin. Pt can follow up with him in the office. Requests straight cath for urine. Diagnosis and Disposition     Discussion:  32 y.o male presents for new fever and episode of hypoxia in his PCP's office. The pt was also confused at his PCP's office, however this is likely due to the valium he received prophylactically to prevent a febrile seizure. In the ED, he has been satting above 92% and does not appear to be in respiratory distress. XR shows a persistent aspiration pneumonia unchanged compared to prior. His labs are reassuring for no acute process, including sepsis. UA is clean.  Flu swab is negative. Discussed with Licha Main DO (pt's PCP), who agrees that since his oxygen is stable and he has no signs of sepsis, he can be discharged home with a 10 day course of oral Levaquin and follow up in his office. Return precautions were provided. DISCHARGE NOTE:  2:40 PM  Va Jackson's  results have been reviewed with his caregiver. His caregiver has been counseled regarding his diagnosis, treatment, and plan. His caregiver verbally conveys understanding and agreement of the signs, symptoms, diagnosis, treatment and prognosis and additionally agrees to follow up as discussed. His caregiver also agrees with the care-plan and conveys that all of his questions have been answered. I have also provided discharge instructions for him that include: educational information regarding their diagnosis and treatment, and list of reasons why they would want to return to the ED prior to their follow-up appointment, should his condition change. He has been provided with education for proper emergency department utilization. CLINICAL IMPRESSION:    1. Aspiration pneumonia of right middle lobe, unspecified aspiration pneumonia type ([address]        PLAN:  1. D/C Home  2. Current Discharge Medication List      START taking these medications    Details   !! levoFLOXacin (LEVAQUIN) 750 mg tablet Take 1 Tab by mouth daily for 10 days. Qty: 10 Tab, Refills: 0       !! - Potential duplicate medications found. Please discuss with provider. CONTINUE these medications which have NOT CHANGED    Details   !! levoFLOXacin (LEVAQUIN) 750 mg tablet Take 1 Tab by mouth daily. Qty: 6 Tab, Refills: 0       !! - Potential duplicate medications found. Please discuss with provider. 3.   Follow-up Information     Follow up With Details Comments Contact Info    Licha Main DO Schedule an appointment as soon as possible for a visit in 3 days For primary care follow up.  [address] [facility] [address]      [facility] EM[facility] Go to As needed, If symptoms worsen [address] [place] [id number]  [phone]        _______________________________    Attestations: This note is prepared by Ramon Lay, acting as Scribe for Dani Villaseñor MD.    Dani Villaseñor MD:  The scribe's documentation has been prepared under my direction and personally reviewed by me in its entirety.   I confirm that the note above accurately reflects all work, treatment, procedures, and medical decision making performed by me.  _______________________________

## 2018-03-13 NOTE — DISCHARGE INSTRUCTIONS
Aspiration Pneumonia: Care Instructions  Your Care Instructions    Aspiration pneumonia is an inflammation of the lungs. It may occur after you breathe in large amounts of foreign material, such as food, liquid, vomit, or mucus. Aspiration may happen because of a health problem that makes it hard to swallow. These problems include stroke or seizure. Pneumonia makes it hard to breathe. Follow-up care is a key part of your treatment and safety. Be sure to make and go to all appointments, and call your doctor if you are having problems. It's also a good idea to know your test results and keep a list of the medicines you take. How can you care for yourself at home? To help with swallowing  · You may need to do exercises to train your muscles to work together to help you swallow. You may also need to learn how to position your body or how to put food in your mouth to be able to swallow better. · You may need to change the foods you eat. Your doctor may tell you to eat certain foods and liquids to make swallowing easier. · You may need to change how you prepare foods. For example, you may need to add thickeners to some liquids, or puree certain foods in a . To help with pneumonia  · Take your antibiotics as directed. Do not stop taking them just because you feel better. You need to take the full course of antibiotics. · Take your medicines exactly as prescribed. For example, your doctor may have given you medicine that makes breathing easier. Call your doctor if you think you are having a problem with your medicine. · Get plenty of rest and sleep. You may feel weak and tired for a while, but your energy level will improve with time. · Take care of your cough so you can rest. A cough that brings up mucus from your lungs is common with pneumonia. It is one way your body gets rid of the infection. But if coughing keeps you from resting or causes severe fatigue and chest-wall pain, talk to your doctor. He or she may suggest that you take a medicine to reduce the cough. · Use a humidifier to increase the moisture in the air. Dry air makes coughing worse. Follow the instructions for cleaning the machine. · Do not smoke, and avoid others' smoke. Smoke will make your cough last longer. If you need help quitting, talk to your doctor about stop-smoking programs and medicines. These can increase your chances of quitting for good. · Take an over-the-counter pain medicine, such as acetaminophen (Tylenol), ibuprofen (Advil, Motrin), or naproxen (Aleve) to help reduce fever and reduce chest pain caused by coughing. Read and follow all instructions on the label. · Do not take two or more pain medicines at the same time unless the doctor told you to. Many pain medicines have acetaminophen, which is Tylenol. Too much acetaminophen (Tylenol) can be harmful. When should you call for help? Call 911 anytime you think you may need emergency care. For example, call if:  ? · You have severe trouble breathing. ?Call your doctor now or seek immediate medical care if:  ? · You have a new or higher fever. ? · You have new or worse trouble breathing. ? · You cough up blood. ? · You are dizzy or lightheaded, or you feel like you may faint. ? Watch closely for changes in your health, and be sure to contact your doctor if:  ? · You do not get better as expected. ? · You are coughing more deeply or more often. Where can you learn more? Go to http://boaz-alee.info/. Enter 12918 [id number] in the search box to learn more about \"Aspiration Pneumonia: Care Instructions. \"  Current as of: May 12, 2017  Content Version: 11.4  © 3857-4075 [employer]. Care instructions adapted under license by [employer] (which disclaims liability or warranty for this information).  If you have questions about a medical condition or this instruction, always ask your healthcare professional. [employer] disclaims any warranty or liability for your use of this information.

## 2018-03-13 NOTE — ED NOTES
Care assumed for discharge. Caregiver given scripts x1 with discharge instructions and verbalizes understanding. Pt placed into his wheelchair by ED staff. Pt discharged home via his wheelchair with caregiver, no distress noted. Patient armband removed and shredded.

## 2018-03-13 NOTE — ED TRIAGE NOTES
Per caregiver of facility pt resides, pt sent to ED by his doctor for fever, increased thick secretions from trach and congestion. Sepsis Screening completed    (  )Patient meets SIRS criteria. (x  )Patient does not meet SIRS criteria.       SIRS Criteria is achieved when two or more of the following are present   Temperature < 96.8°F (36°C) or > 100.9°F (38.3°C)   Heart Rate > 90 beats per minute   Respiratory Rate > 20 breaths per minute   WBC count > 12,000 or <4,000 or > 10% bands

## 2018-03-13 NOTE — PROGRESS NOTES
Patient suctioned for moderate thick white secretions and Fio2 increased from 24% to 28% at 3lpm via venturi system for a Spo2 of 95%

## 2018-03-19 LAB
BACTERIA SPEC CULT: NORMAL
SERVICE CMNT-IMP: NORMAL

## 2018-06-07 PROBLEM — N31.9 NEUROGENIC BLADDER: Status: ACTIVE | Noted: 2018-06-07
